# Patient Record
Sex: FEMALE
[De-identification: names, ages, dates, MRNs, and addresses within clinical notes are randomized per-mention and may not be internally consistent; named-entity substitution may affect disease eponyms.]

---

## 2017-04-05 ENCOUNTER — HOSPITAL ENCOUNTER (EMERGENCY)
Dept: HOSPITAL 14 - H.ER | Age: 47
LOS: 1 days | Discharge: HOME | End: 2017-04-06
Payer: COMMERCIAL

## 2017-04-05 VITALS
RESPIRATION RATE: 18 BRPM | HEART RATE: 101 BPM | OXYGEN SATURATION: 100 % | SYSTOLIC BLOOD PRESSURE: 135 MMHG | DIASTOLIC BLOOD PRESSURE: 78 MMHG | TEMPERATURE: 98.1 F

## 2017-04-05 DIAGNOSIS — R10.31: Primary | ICD-10-CM

## 2017-04-05 LAB
ALBUMIN/GLOB SERPL: 1.1 {RATIO} (ref 1–2.1)
ALP SERPL-CCNC: 78 U/L (ref 38–126)
ALT SERPL-CCNC: 18 U/L (ref 9–52)
AST SERPL-CCNC: 23 U/L (ref 14–36)
BACTERIA #/AREA URNS HPF: (no result) /[HPF]
BASOPHILS # BLD AUTO: 0 K/UL (ref 0–0.2)
BASOPHILS NFR BLD: 0.3 % (ref 0–2)
BILIRUB SERPL-MCNC: 0.4 MG/DL (ref 0.2–1.3)
BILIRUB UR-MCNC: NEGATIVE MG/DL
BUN SERPL-MCNC: 14 MG/DL (ref 7–17)
CALCIUM SERPL-MCNC: 9 MG/DL (ref 8.4–10.2)
CHLORIDE SERPL-SCNC: 107 MMOL/L (ref 98–107)
CO2 SERPL-SCNC: 26 MMOL/L (ref 22–30)
COLOR UR: YELLOW
EOSINOPHIL # BLD AUTO: 0.1 K/UL (ref 0–0.7)
EOSINOPHIL NFR BLD: 1.4 % (ref 0–4)
ERYTHROCYTE [DISTWIDTH] IN BLOOD BY AUTOMATED COUNT: 13.3 % (ref 11.5–14.5)
GLOBULIN SER-MCNC: 3.8 GM/DL (ref 2.2–3.9)
GLUCOSE SERPL-MCNC: 84 MG/DL (ref 65–105)
GLUCOSE UR STRIP-MCNC: NEGATIVE MG/DL
HCT VFR BLD CALC: 38.2 % (ref 34–47)
KETONES UR STRIP-MCNC: NEGATIVE MG/DL
LEUKOCYTE ESTERASE UR-ACNC: NEGATIVE LEU/UL
LYMPHOCYTES # BLD AUTO: 2.7 K/UL (ref 1–4.3)
LYMPHOCYTES NFR BLD AUTO: 26.7 % (ref 20–40)
MCH RBC QN AUTO: 30.2 PG (ref 27–31)
MCHC RBC AUTO-ENTMCNC: 32.8 G/DL (ref 33–37)
MCV RBC AUTO: 92 FL (ref 81–99)
MONOCYTES # BLD: 0.9 K/UL (ref 0–0.8)
MONOCYTES NFR BLD: 9.3 % (ref 0–10)
NEUTROPHILS # BLD: 6.3 K/UL (ref 1.8–7)
NEUTROPHILS NFR BLD AUTO: 62.3 % (ref 50–75)
NRBC BLD AUTO-RTO: 0 % (ref 0–0)
PH UR STRIP: 5 [PH] (ref 5–8)
PLATELET # BLD: 301 K/UL (ref 130–400)
PMV BLD AUTO: 9.4 FL (ref 7.2–11.7)
POTASSIUM SERPL-SCNC: 4.3 MMOL/L (ref 3.6–5)
PROT SERPL-MCNC: 8.1 G/DL (ref 6.3–8.2)
PROT UR STRIP-MCNC: NEGATIVE MG/DL
RBC # UR STRIP: (no result) /UL
RBC #/AREA URNS HPF: 1 /HPF (ref 0–3)
SODIUM SERPL-SCNC: 148 MMOL/L (ref 132–148)
SP GR UR STRIP: 1.03 (ref 1–1.03)
UROBILINOGEN UR-MCNC: 0.2 MG/DL (ref 0.2–1)
WBC # BLD AUTO: 10.2 K/UL (ref 4.8–10.8)
WBC #/AREA URNS HPF: 1 /HPF (ref 0–5)

## 2017-04-05 NOTE — ED PDOC
HPI: Abdomen


Time Seen by Provider: 17 19:17


Chief Complaint (Nursing): Abdominal Pain


Chief Complaint (Provider): right sided abd pain


History Per: Patient


History/Exam Limitations: no limitations


Onset/Duration Of Symptoms: Days (3)


Outside of [place] travel?: No


Current Symptoms Are (Timing): Still Present


Location Of Pain/Discomfort: RLQ, Other (radiaition to back )


Quality Of Discomfort: Sharp, Pressure


Associated Symptoms: Loss Of Appetite, Back Pain.  denies: Fever, Chills, Nausea

, Vomiting, Diarrhea, Chest Pain, Constipation, Urinary Symptoms


Exacerbating Factors: Movement


Alleviating Factors: None


Last Bowel Movement: Today


Abnormal Vaginal Bleeding: No





Past Medical History


Reviewed: Historical Data, Nursing Documentation, Vital Signs


Vital Signs: 


 Last Vital Signs











Temp  98.1 F   17 17:42


 


Pulse  101 H  17 17:42


 


Resp  18   17 17:42


 


BP  135/78   17 17:42


 


Pulse Ox  100   17 19:19














- Medical History


PMH: Anemia, Rheumatoid Arthritis


   Denies: Chronic Kidney Disease





- Surgical History


Surgical History: Cholecystectomy,  ( x 1)





- Family History


Family History: States: Unknown Family Hx





- Immunization History


Hx Tetanus Toxoid Vaccination: No (unsure)





- Home Medications


Home Medications: 


 Ambulatory Orders











 Medication  Instructions  Recorded


 


Ferrous Sulfate 325 mg PO TID #60 tab 08/15/15


 


MedroxyPROGESTERone [Provera] 10 mg PO TID #28 tab 08/15/15


 


Cephalexin [Keflex] 500 mg PO QID 10 Days 08/17/15


 


Oxycodone HCl/Acetaminophen 1 tab PO QID PRN #15 tab 08/17/15





[Percocet 5-325 mg Tablet]  


 


Fluconazole [Diflucan] 150 mg PO ONCE #1 tab 08/25/15


 


Ibuprofen [Motrin Tab] 800 mg PO Q8 PRN #30 tab 08/25/15


 


Ibuprofen [Motrin] 600 mg PO TID 7 Days 16


 


valACYclovir [Valtrex] 1 gm PO TID 7 Days 16


 


Doxycycline Monohydrate 100 mg PO BID #28 tablet 16


 


Mupirocin 2% Ointment [Bactroban 0.5 gm TP BID #1 tube 16





Ointment]  


 


Naproxen [Naprosyn Tab] 375 mg PO Q8 PRN #21 tab 16














- Allergies


Allergies/Adverse Reactions: 


 Allergies











Allergy/AdvReac Type Severity Reaction Status Date / Time


 


No Known Allergies Allergy   Verified 17 17:41














Review of Systems


ROS Statement: Except As Marked, All Systems Reviewed And Found Negative


Constitutional: Negative for: Fever, Chills


Gastrointestinal: Positive for: Abdominal Pain.  Negative for: Nausea, Vomiting





Physical Exam





- Reviewed


Nursing Documentation Reviewed: Yes


Vital Signs Reviewed: Yes





- Physical Exam


Appears: Positive for: Non-toxic, No Acute Distress, Uncomfortable


Head Exam: Positive for: ATRAUMATIC, NORMAL INSPECTION, NORMOCEPHALIC


Skin: Positive for: Normal Color, Warm, DRY


Cardiovascular/Chest: Positive for: Regular Rate, Rhythm


Respiratory: Positive for: CNT, Normal Breath Sounds


Gastrointestinal/Abdominal: Positive for: Bowel Sounds, Soft, Tenderness (RLQ 

pain), Guarding


Back: Negative for: L CVA Tenderness, R CVA Tenderness


Extremity: Positive for: Normal ROM


Neurologic/Psych: Positive for: Alert, Oriented





- ECG


O2 Sat by Pulse Oximetry: 100





- Progress


ED Course And Treament: 


cbc/cmp/fluids torodol and CT scna r/o appy





Disposition





- Clinical Impression


Clinical Impression: 


 Abdominal pain





- Patient ED Disposition


Is Patient to be Admitted: Transfer of Care





- Disposition


Disposition: Transfer of Care


Disposition Time: 20:00


Condition: STABLE


Patient Signed Over To: Amber Liz


Handoff Comments: pending CT scan and labs.

## 2017-04-06 NOTE — ED PDOC
- Laboratory Results


Result Diagrams: 


 04/05/17 21:40





 04/05/17 21:40





- ECG


O2 Sat by Pulse Oximetry: 100





- Progress


ED Course And Treament: 


Case endorsed to writer from [facility] pending labs, CT





PROCEDURE:  CT Abdomen and Pelvis with contrast





HISTORY:


RLQ pain





COMPARISON:


None.





TECHNIQUE:


Contrast dose: 90 cc of Omnipaque





Radiation dose:





Total exam DLP = 598 mGy-cm.





This CT exam was performed using one or more of the following dose reduction 

techniques: Automated exposure control, adjustment of the mA and/or kV 

according to patient size, and/or use of iterative reconstruction technique.





FINDINGS:





LOWER THORAX:


Unremarkable. 





LIVER:


Unremarkable. No gross lesion or ductal dilatation. 





GALLBLADDER AND BILE DUCTS:


Gallbladder removed 





PANCREAS:


Unremarkable. No gross lesion or ductal dilatation.





SPLEEN:


Unremarkable. 





ADRENALS:


Unremarkable. No mass. 





KIDNEYS AND URETERS:


Unremarkable. No hydronephrosis. No solid mass. 





VASCULATURE:


Unremarkable. No aortic aneurysm. 





BOWEL:


Unremarkable. No obstruction. No gross mural thickening. 





APPENDIX:


Normal appendix. 





PERITONEUM:


Unremarkable. No free fluid. No free air. 





LYMPH NODES:


Unremarkable. No enlarged lymph nodes. 





BLADDER:


Unremarkable. 





REPRODUCTIVE:


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  

There is fluid in the endometrial canal and submucosal enhancement. There is a 

small amount of fluid in the cul-de-sac. The findings are probably related to 

the menstrual cycle or ovarian cyst rupture. 





BONES:


No acute fracture. 





OTHER FINDINGS:


None.





IMPRESSION:


No acute intra-abdominal findings.





Small amount of fluid in the cul-de-sac.  Fluid in the endometrial canal





Patient educated on findings, discharged with rx Naproxen.


Advised follow up PMD, Gyn. 


Return to ED for worsening/concerning symptoms.





Disposition





- Clinical Impression


Clinical Impression: 


 Abdominal pain, Ovarian cyst rupture





- POA


Present On Arrival: None





- Disposition


Referrals: 


[facility] [Outside]


[facility] [Outside]


Disposition: Routine/Home


Disposition Time: 00:16


Condition: STABLE


Prescriptions: 


Naproxen [Naprosyn] 500 mg PO Q12 PRN #20 tablet


 PRN Reason: Pain, Moderate (4-7)


Instructions:  Abdominal Pain (ED), Ovarian Cyst (ED)

## 2017-05-26 ENCOUNTER — HOSPITAL ENCOUNTER (EMERGENCY)
Dept: HOSPITAL 14 - H.ER | Age: 47
Discharge: LEFT BEFORE DISCHARGE | End: 2017-05-26
Payer: COMMERCIAL

## 2017-05-26 VITALS
RESPIRATION RATE: 20 BRPM | SYSTOLIC BLOOD PRESSURE: 130 MMHG | OXYGEN SATURATION: 100 % | TEMPERATURE: 98.1 F | HEART RATE: 87 BPM | DIASTOLIC BLOOD PRESSURE: 82 MMHG

## 2017-05-26 VITALS — BODY MASS INDEX: 26.5 KG/M2

## 2017-05-26 DIAGNOSIS — R11.0: ICD-10-CM

## 2017-05-26 DIAGNOSIS — R10.31: Primary | ICD-10-CM

## 2017-05-26 DIAGNOSIS — M06.9: ICD-10-CM

## 2017-05-26 DIAGNOSIS — D64.9: ICD-10-CM

## 2017-05-26 LAB
ALBUMIN/GLOB SERPL: 1.3 {RATIO} (ref 1–2.1)
ALP SERPL-CCNC: 71 U/L (ref 38–126)
ALT SERPL-CCNC: 24 U/L (ref 9–52)
AST SERPL-CCNC: 23 U/L (ref 14–36)
BACTERIA #/AREA URNS HPF: (no result) /[HPF]
BASOPHILS # BLD AUTO: 0 K/UL (ref 0–0.2)
BASOPHILS NFR BLD: 0.2 % (ref 0–2)
BILIRUB SERPL-MCNC: 0.3 MG/DL (ref 0.2–1.3)
BILIRUB UR-MCNC: NEGATIVE MG/DL
BUN SERPL-MCNC: 8 MG/DL (ref 7–17)
CALCIUM SERPL-MCNC: 8.6 MG/DL (ref 8.4–10.2)
CHLORIDE SERPL-SCNC: 107 MMOL/L (ref 98–107)
CO2 SERPL-SCNC: 25 MMOL/L (ref 22–30)
COLOR UR: YELLOW
EOSINOPHIL # BLD AUTO: 0.1 K/UL (ref 0–0.7)
EOSINOPHIL NFR BLD: 0.3 % (ref 0–4)
ERYTHROCYTE [DISTWIDTH] IN BLOOD BY AUTOMATED COUNT: 13 % (ref 11.5–14.5)
GLOBULIN SER-MCNC: 3.3 GM/DL (ref 2.2–3.9)
GLUCOSE SERPL-MCNC: 88 MG/DL (ref 65–105)
GLUCOSE UR STRIP-MCNC: (no result) MG/DL
HCT VFR BLD CALC: 38.5 % (ref 34–47)
KETONES UR STRIP-MCNC: NEGATIVE MG/DL
LEUKOCYTE ESTERASE UR-ACNC: (no result) LEU/UL
LG PLATELETS BLD QL SMEAR: PRESENT
LIPASE SERPL-CCNC: 88 U/L (ref 23–300)
LYMPHOCYTES # BLD AUTO: 1.2 K/UL (ref 1–4.3)
LYMPHOCYTES NFR BLD AUTO: 6.5 % (ref 20–40)
MCH RBC QN AUTO: 29.7 PG (ref 27–31)
MCHC RBC AUTO-ENTMCNC: 32.7 G/DL (ref 33–37)
MCV RBC AUTO: 90.7 FL (ref 81–99)
MONOCYTES # BLD: 0.9 K/UL (ref 0–0.8)
MONOCYTES NFR BLD: 4.7 % (ref 0–10)
NEUTROPHILS # BLD: 16.4 K/UL (ref 1.8–7)
NEUTROPHILS NFR BLD AUTO: 86 % (ref 42–75)
NEUTROPHILS NFR BLD AUTO: 88.3 % (ref 50–75)
NRBC BLD AUTO-RTO: 0 % (ref 0–0)
PH UR STRIP: 6 [PH] (ref 5–8)
PLATELET # BLD: 297 K/UL (ref 130–400)
PMV BLD AUTO: 9.3 FL (ref 7.2–11.7)
POTASSIUM SERPL-SCNC: 3.9 MMOL/L (ref 3.6–5)
PROT SERPL-MCNC: 7.5 G/DL (ref 6.3–8.2)
PROT UR STRIP-MCNC: NEGATIVE MG/DL
RBC # UR STRIP: (no result) /UL
RBC #/AREA URNS HPF: 1 /HPF (ref 0–3)
SODIUM SERPL-SCNC: 141 MMOL/L (ref 132–148)
SP GR UR STRIP: 1.02 (ref 1–1.03)
TOTAL CELLS COUNTED BLD: 100
UROBILINOGEN UR-MCNC: (no result) MG/DL (ref 0.2–1)
WBC # BLD AUTO: 18.6 K/UL (ref 4.8–10.8)
WBC #/AREA URNS HPF: 1 /HPF (ref 0–5)

## 2017-05-26 NOTE — CT
PROCEDURE:  CT Abdomen and Pelvis with contrast



HISTORY:

RLQ pain, leukocytosis



COMPARISON:

Comparison is made to the previous study dated 04/05/2017



TECHNIQUE:

Contrast dose: 95 cc of Omnipaque 300. 



Axial and reformatted coronal and sagittal CT images of the abdomen 

and pelvis were obtained after IV contrast administration. 



Radiation dose:



Total exam DLP = 663.06 mGy-cm.



This CT exam was performed using one or more of the following dose 

reduction techniques: Automated exposure control, adjustment of the 

mA and/or kV according to patient size, and/or use of iterative 

reconstruction technique.



FINDINGS:



LOWER THORAX:

Unremarkable. 



LIVER:

Unremarkable. No gross lesion or ductal dilatation. 



GALLBLADDER AND BILE DUCTS:

Status post cholecystectomy. 



PANCREAS:

Unremarkable. No gross lesion or ductal dilatation.



SPLEEN:

Unremarkable. 



ADRENALS:

Unremarkable. No mass. 



KIDNEYS AND URETERS:

Mildly dilated collecting system of both kidneys is again noted. No 

evidence of obstructing stone. The kidneys enhance symmetrically. 



VASCULATURE:

Unremarkable. No aortic aneurysm. 



BOWEL:

Mildly dilated small and large bowel lobes demonstrate mild diffuse 

wall thickening. Findings suspicious for enteritis/colitis. No 

evidence of bowel obstruction.  Scattered colonic diverticulosis 

without evidence of diverticulitis.



APPENDIX:

No evidence of appendicitis. 



PERITONEUM:

Unremarkable. No free fluid. No free air. 



LYMPH NODES:

Unremarkable. No enlarged lymph nodes. 



BLADDER:

Unremarkable. 



REPRODUCTIVE:

The uterus is again mildly enlarged demonstrate part cornuate 

configuration.  There is thick enhancing endometrium and small amount 

of fluid seen in the endometrial cavity. The possibility of 

infectious process such as endometritis is not totally excluded 

although less likely as these findings were also seen in the previous 

exam. 



BONES:

No acute fracture. 



OTHER FINDINGS:

None.



IMPRESSION:

No evidence of appendicitis.



Diffuse small and large bowel wall thickening suspicious for 

enteritis/colitis. No evidence of bowel obstruction or pneumatosis.



Re- demonstration of mildly enlarged heterogeneous uterus 

demonstrates part cornuate configuration and diffuse thick enhancing 

endometrium and small amount of fluid in the endometrial cavity.  

Findings have not significantly changed since the previous exam.  

Correlate clinically for possible enteritis.



Small cyst at the left adnexa measures 2 centimeter.



Otherwise no significant interval change since the previous study.

## 2017-05-26 NOTE — US
PROCEDURE:  Pelvic ultrasound dated 05/26/2017



HISTORY:

RLQ pain



COMPARISON:

Comparison made with prior pelvic ultrasound 01/20/2017



TECHNIQUE:

Transabdominal/transvaginal sonographic evaluation of the pelvis 

performed.



FINDINGS:

Uterus is retroverted measuring approximately 7.9 x 7.6 x 4.0 cm.  

Endometrial stripe measures 4.2 mm. There is free fluid seen within 

cul de sac that exhibits low level internal echoes suggesting debris. 



Right ovary measures 3.1 x 1.9 x 1.0 cm exhibits arterial flow. 



Left ovary measures 3.1 x 1.4 0.86 cm exhibits arterial flow.  There 

is a small cyst measuring 2.1 x 1.6 x 1.77 



IMPRESSION:

Small left ovarian cyst.  Small amount of free fluid seen within cul 

de sac which exhibits some low-level internal echoes suggesting 

debris within this free fluid.  Clinic correlation recommended.



Follow-up ultrasound could be performed in 6 weeks to assess for 

resolution

## 2017-05-26 NOTE — ED PDOC
HPI: Abdomen


Time Seen by Provider: 17 09:11


Chief Complaint (Nursing): Abdominal Pain


Chief Complaint (Provider): abdominal pain, pelvic pain


History Per: Patient


History/Exam Limitations: no limitations


Onset/Duration Of Symptoms: Days (2)


Current Symptoms Are (Timing): Still Present


Location Of Pain/Discomfort: RLQ, Suprapubic


Quality Of Discomfort: Sharp


Associated Symptoms: Nausea.  denies: Vomiting, Diarrhea, Loss Of Appetite, 

Urinary Symptoms


Exacerbating Factors: None


Alleviating Factors: None


Last Bowel Movement: Today


Additional Complaint(s): 





46yo female c/o RLQ and suprapubic pelvic pain for last 2 days, associated w 

nausea and vaginal bleeding. Denies fever, urinary symptoms or diarrhea. Had 

surgery w Dr My Martinez for ?uterus problem (shes unsure if endometriosis/

myomectomy/etc).  States menses irregular. Denies possibility of being pregnant

, concern for STDs or vaginal discharge. 





Against Medical Advice





- AMA


Patient Left Against Medical Advice: 


The patient declines admission to the hospital and wishes to leave the 

Emergency Department.  This action is against my medical advice. This decision 

was made with informed refusal. The patient was told that admission to the 

hospital is necessary.  Explanation of the reasons why were discussed.  


The risks of leaving were explained to the patient and include, but are not 

limited to, worsening of known or currently unknown conditions, permanent 

disability and death from undiagnosed or untreated conditions. 


The patient has the capacity to make this informed decision and understands my 

explanation of the current medical problem and risks of leaving. 


The patient voluntarily accepts these risks and signed an AMA form documenting 

our conversation.


The patient was given the opportunity to ask questions and reconsider.  The 

patient was encouraged to return to the Emergency Department at any time for 

further care.








Past Medical History


Reviewed: Historical Data, Nursing Documentation, Vital Signs


Vital Signs: 


 Last Vital Signs











Temp  98.1 F   17 08:59


 


Pulse  87   17 08:59


 


Resp  20   17 08:59


 


BP  130/82   17 08:59


 


Pulse Ox  100   17 13:14














- Medical History


PMH: Anemia, Rheumatoid Arthritis


   Denies: Chronic Kidney Disease





- Surgical History


Surgical History: Cholecystectomy,  ( x 1)





- Family History


Family History: States: Unknown Family Hx





- Living Arrangements


Living Arrangements: With Family





- Social History


Current smoker - smoking cessation education provided: No





- Immunization History


Hx Tetanus Toxoid Vaccination: No (unsure)





- Home Medications


Home Medications: 


 Ambulatory Orders











 Medication  Instructions  Recorded


 


Ferrous Sulfate 325 mg PO TID #60 tab 08/15/15


 


MedroxyPROGESTERone [Provera] 10 mg PO TID #28 tab 08/15/15


 


Cephalexin [Keflex] 500 mg PO QID 10 Days 08/17/15


 


Oxycodone HCl/Acetaminophen 1 tab PO QID PRN #15 tab 08/17/15





[Percocet 5-325 mg Tablet]  


 


Fluconazole [Diflucan] 150 mg PO ONCE #1 tab 08/25/15


 


Ibuprofen [Motrin Tab] 800 mg PO Q8 PRN #30 tab 08/25/15


 


Ibuprofen [Motrin] 600 mg PO TID 7 Days 16


 


valACYclovir [Valtrex] 1 gm PO TID 7 Days 16


 


Doxycycline Monohydrate 100 mg PO BID #28 tablet 16


 


Mupirocin 2% Ointment [Bactroban 0.5 gm TP BID #1 tube 16





Ointment]  


 


Naproxen [Naprosyn Tab] 375 mg PO Q8 PRN #21 tab 16


 


Naproxen [Naprosyn] 500 mg PO Q12 PRN #20 tablet 17


 


Ciprofloxacin [Cipro] 500 mg PO BID #14 tab 17


 


Naproxen [Naprosyn] 500 mg PO BID PRN #14 tablet 17














- Allergies


Allergies/Adverse Reactions: 


 Allergies











Allergy/AdvReac Type Severity Reaction Status Date / Time


 


No Known Allergies Allergy   Verified 17 17:41














Review of Systems


ROS Statement: Except As Marked, All Systems Reviewed And Found Negative


Constitutional: Negative for: Fever, Chills


Cardiovascular: Negative for: Orthopnea


Gastrointestinal: Positive for: Nausea, Abdominal Pain.  Negative for: Vomiting

, Diarrhea


Genitourinary Female: Positive for: Vaginal Bleeding, Pelvic Pain.  Negative for

: Dysuria, Frequency, Vaginal Discharge


Musculoskeletal: Negative for: Leg Pain


Skin: Negative for: Rash, Lesions


Neurological: Negative for: Weakness, Change in Speech, Confusion





Physical Exam





- Reviewed


Nursing Documentation Reviewed: Yes


Vital Signs Reviewed: Yes





- Physical Exam


Appears: Positive for: Well, Non-toxic, No Acute Distress


Head Exam: Positive for: ATRAUMATIC, NORMAL INSPECTION, NORMOCEPHALIC


Skin: Positive for: Normal Color, Warm, DRY


Eye Exam: Positive for: EOMI, Normal appearance, PERRL


ENT: Positive for: Normal ENT Inspection


Neck: Positive for: Normal, Painless ROM


Cardiovascular/Chest: Positive for: Regular Rate, Rhythm


Respiratory: Positive for: CNT, Normal Breath Sounds


Gastrointestinal/Abdominal: Positive for: Bowel Sounds, Soft, Tenderness (RLQ).

  Negative for: Guarding, Rebound


Back: Positive for: Normal Inspection


Extremity: Positive for: Normal ROM


Neurologic/Psych: Positive for: Alert, Oriented.  Negative for: Motor/Sensory 

Deficits





- Laboratory Results


Result Diagrams: 


 17 10:00





 17 10:00





- ECG


O2 Sat by Pulse Oximetry: 100





Medical Decision Making


Medical Decision Making: 





Workup initiated for pelvic pain in likely perimenopausal female


Bloodwork and imaging ordered.


Preg neg.


UDip +blood





Labs reviewed, reveal elev WBC at 18, will obtain CT imaging r/o appendicitis 





CT report reviewed, appendix normal, poss endometritis but looks similar to 

prior study.


US pelvis +flow both ovaries free fluid internal echoes. 





1pm- states has to  son, I explained potential for pelvic infection and 

need for pelvic exam and further testing but states she feels better and needs 

to leave hospital. Pt signed AMA after risks and benefits explained.  Rx cipro 

but understood may need further testing or Abx and to return to ER for 

completion of treatment when able to do so. 














Disposition





- Clinical Impression


Clinical Impression: 


 Abdominal pain, Left against medical advice








- Patient ED Disposition


Is Patient to be Admitted: No


Counseled Patient/Family Regarding: Studies Performed, Diagnosis, Need For 

Followup, Rx Given





- Disposition


[facility]errals: 


[facility] [[facility]


Disposition: Against Medical Advice


Disposition Time: 13:00


Condition: STABLE


Additional Instructions: 


Take medication as directed,  return to ER at anytime for completion of testing 

and treatment.


Prescriptions: 


Ciprofloxacin [Cipro] 500 mg PO BID #14 tab


Naproxen [Naprosyn] 500 mg PO BID PRN #14 tablet


 PRN Reason: Pain, Moderate (4-7)


Instructions:  Acute Abdominal Pain (ED), Pelvic Pain in Women (ED), Against 

Medical Advice (ED)

## 2017-09-06 ENCOUNTER — HOSPITAL ENCOUNTER (OUTPATIENT)
Dept: HOSPITAL 31 - C.ENDO | Age: 47
Discharge: HOME | End: 2017-09-06
Attending: INTERNAL MEDICINE
Payer: COMMERCIAL

## 2017-09-06 VITALS — DIASTOLIC BLOOD PRESSURE: 60 MMHG | SYSTOLIC BLOOD PRESSURE: 102 MMHG | HEART RATE: 88 BPM | RESPIRATION RATE: 9 BRPM

## 2017-09-06 VITALS — OXYGEN SATURATION: 100 % | TEMPERATURE: 98.6 F

## 2017-09-06 VITALS — BODY MASS INDEX: 26.5 KG/M2

## 2017-09-06 DIAGNOSIS — K52.89: Primary | ICD-10-CM

## 2017-09-06 DIAGNOSIS — K57.30: ICD-10-CM

## 2017-09-06 DIAGNOSIS — K64.8: ICD-10-CM

## 2017-09-06 PROCEDURE — 45378 DIAGNOSTIC COLONOSCOPY: CPT

## 2017-09-06 PROCEDURE — 84703 CHORIONIC GONADOTROPIN ASSAY: CPT

## 2017-11-21 ENCOUNTER — HOSPITAL ENCOUNTER (EMERGENCY)
Dept: HOSPITAL 14 - H.ER | Age: 47
Discharge: HOME | End: 2017-11-21
Payer: COMMERCIAL

## 2017-11-21 VITALS
HEART RATE: 84 BPM | OXYGEN SATURATION: 97 % | RESPIRATION RATE: 15 BRPM | SYSTOLIC BLOOD PRESSURE: 119 MMHG | DIASTOLIC BLOOD PRESSURE: 78 MMHG | TEMPERATURE: 98.2 F

## 2017-11-21 VITALS — BODY MASS INDEX: 26.5 KG/M2

## 2017-11-21 DIAGNOSIS — J02.9: ICD-10-CM

## 2017-11-21 DIAGNOSIS — H60.93: Primary | ICD-10-CM

## 2017-11-21 NOTE — ED PDOC
HPI: CCC, URI, Sore Throat


Time Seen by Provider: 17 14:46


Chief Complaint (Nursing): ENT Problem


Chief Complaint (Provider): Sore throat, ear pain


History Per: Patient


History/Exam Limitations: no limitations


Onset/Duration Of Symptoms: Days (x 1 week)


Current Symptoms Are (Timing): Still Present


Location Of Pain: Ear(s), Throat


Sick Contacts (Context): Family Member(s)


Additional Complaint(s): 





An is a 48 y/o female who presents to the ED complaining of a one week 

history of left ear pain that became associated with right ear pain 2 days ago, 

nasal congestion, and sore throat.  She notes swallowing makes the ear pain 

worse, and feels as though the ear pain radiates to her throat.  She denies any 

fever, chills, or cough. Patient reports sick contact in her son who has been 

sick with similar symptoms.  She reports attempting Ibuprofen in the past for 

pain, but denies taking any medication today for symptoms.





PMD: unsure of name  





Past Medical History


Reviewed: Historical Data, Nursing Documentation, Vital Signs


Vital Signs: 


 Last Vital Signs











Temp  98.2 F   17 15:27


 


Pulse  84   17 15:27


 


Resp  15   17 15:27


 


BP  119/78   17 15:27


 


Pulse Ox  97   17 15:27














- Medical History


PMH: No Chronic Diseases, Anemia, Rheumatoid Arthritis


   Denies: Chronic Kidney Disease





- Surgical History


Surgical History: Cholecystectomy,  ( x 1)





- Family History


Family History: States: Unknown Family Hx





- Immunization History


Hx Tetanus Toxoid Vaccination: No (unsure)





- Home Medications


Home Medications: 


 Ambulatory Orders











 Medication  Instructions  Recorded


 


Acetaminophen [Tylenol 325mg tab] 650 mg PO QID 7 Days #25 tab 17


 


Amoxicillin 500 mg PO TID 10 Days #30 tablet 17


 


Ibuprofen 600 mg PO QID 7 Days #25 tablet 17


 


Neomycin/Polymyxin/Hydrocort 1 drop AU QID 7 Days #1 bottle 17





[Cortisporin Otic Soln]  














- Allergies


Allergies/Adverse Reactions: 


 Allergies











Allergy/AdvReac Type Severity Reaction Status Date / Time


 


SEASONAL Allergy  CONGESTION Uncoded 17 14:55














Review of Systems


ROS Statement: Except As Marked, All Systems Reviewed And Found Negative


Constitutional: Negative for: Fever, Chills


ENT: Positive for: Ear Pain (bilateral), Nose Congestion, Throat Pain, Other (

pain with swallowing)


Respiratory: Negative for: Cough





Physical Exam





- Reviewed


Nursing Documentation Reviewed: Yes


Vital Signs Reviewed: Yes





- Physical Exam


Appears: Positive for: Well, Non-toxic, No Acute Distress


Head Exam: Positive for: ATRAUMATIC, NORMAL INSPECTION, NORMOCEPHALIC


Skin: Positive for: Normal Color, Warm, Dry


Eye Exam: Positive for: EOMI, Normal appearance, PERRL


ENT: Positive for: TM Is/Are (Left ear canal erythematous and edematous, (+) 

tenderness to left tragus. Left TM with mild erythema. Right TM erythematous 

and bulging, also (+) tenderness on palpation of right tragus), Pharyngeal 

Erythema, Tonsillar Swelling (bilaterally, erythematous, no exudates noted).  

Negative for: Tonsillar Exudate


Neck: Positive for: Normal, Supple


Cardiovascular/Chest: Positive for: Regular Rate, Rhythm.  Negative for: Murmur


Respiratory: Positive for: Normal Breath Sounds.  Negative for: Accessory 

Muscle Use, Respiratory Distress


Neurologic/Psych: Positive for: Alert, Oriented (x 3)





- ECG


O2 Sat by Pulse Oximetry: 97


Pulse Ox Interpretation: Normal





Medical Decision Making


Medical Decision Making: 


Initial Impression: Otitis media





Time: 15:13


Initial Plan:


--Tylenol 650 mg PO


--Cortisporin Otic solution, 4 drops AD


--Amoxil 500 mg PO





On reevaluation, patient resting comfortably in no acute distress.  She reports 

improvement of symptoms.  Patient stable for discharge.





Patient advised to take prescribed medications, and to f/u with PMD.  Return 

for any worsening or change in symptoms.


--------------------------------------------------------------------------------

-----------------


Scribe Attestation:   


Documented by Dione Mesa, acting as a scribe for Christie Decker PA-C





Provider Scribe Attestation:


All medical record entries made by the Scribe were at my direction and 

personally dictated by me. I have reviewed the chart and agree that the record 

accurately reflects my personal performance of the history, physical exam, 

medical decision making, and the department course for this patient. I have 

also personally directed, reviewed, and agree with the discharge instructions 

and disposition.








Disposition





- Clinical Impression


Clinical Impression: 


 Otitis media, Otitis externa, Pharyngitis








- Patient ED Disposition


Is Patient to be Admitted: No


Counseled Patient/Family Regarding: Diagnosis, Need For Followup, Rx Given





- Disposition


Referrals: 


[facility] [Outside]


Disposition: Routine/Home


Disposition Time: 15:50


Condition: STABLE


Additional Instructions: 


Take prescribed medications, and f/u with PMD.  Return for any worsening or 

change in symptoms.


Prescriptions: 


Acetaminophen [Tylenol 325mg tab] 650 mg PO QID 7 Days #25 tab


Amoxicillin 500 mg PO TID 10 Days #30 tablet


Ibuprofen 600 mg PO QID 7 Days #25 tablet


Neomycin/Polymyxin/Hydrocort [Cortisporin Otic Soln] 1 drop AU QID 7 Days #1 

bottle


Instructions:  Pharyngitis (ED), Otitis Externa (ED), Otitis Media (ED), Upper 

Respiratory Infection (ED)


Forms:  [facility] (English)


Print Language: ENGLISH

## 2017-11-23 ENCOUNTER — HOSPITAL ENCOUNTER (EMERGENCY)
Dept: HOSPITAL 14 - H.ER | Age: 47
Discharge: HOME | End: 2017-11-23
Payer: COMMERCIAL

## 2017-11-23 VITALS — RESPIRATION RATE: 17 BRPM

## 2017-11-23 VITALS
TEMPERATURE: 98 F | HEART RATE: 104 BPM | DIASTOLIC BLOOD PRESSURE: 78 MMHG | OXYGEN SATURATION: 98 % | SYSTOLIC BLOOD PRESSURE: 120 MMHG

## 2017-11-23 VITALS — BODY MASS INDEX: 26.5 KG/M2

## 2017-11-23 DIAGNOSIS — M06.9: ICD-10-CM

## 2017-11-23 DIAGNOSIS — J11.1: Primary | ICD-10-CM

## 2017-11-23 LAB
ALBUMIN/GLOB SERPL: 1.2 {RATIO} (ref 1–2.1)
ALP SERPL-CCNC: 77 U/L (ref 38–126)
ALT SERPL-CCNC: 30 U/L (ref 9–52)
AST SERPL-CCNC: 23 U/L (ref 14–36)
BACTERIA #/AREA URNS HPF: (no result) /[HPF]
BASOPHILS # BLD AUTO: 0 K/UL (ref 0–0.2)
BASOPHILS NFR BLD: 0.2 % (ref 0–2)
BILIRUB SERPL-MCNC: 0.4 MG/DL (ref 0.2–1.3)
BILIRUB UR-MCNC: NEGATIVE MG/DL
BUN SERPL-MCNC: 10 MG/DL (ref 7–17)
CALCIUM SERPL-MCNC: 8.6 MG/DL (ref 8.4–10.2)
CHLORIDE SERPL-SCNC: 106 MMOL/L (ref 98–107)
CO2 SERPL-SCNC: 25 MMOL/L (ref 22–30)
COLOR UR: YELLOW
EOSINOPHIL # BLD AUTO: 0 K/UL (ref 0–0.7)
EOSINOPHIL NFR BLD: 0.3 % (ref 0–4)
ERYTHROCYTE [DISTWIDTH] IN BLOOD BY AUTOMATED COUNT: 13.7 % (ref 11.5–14.5)
GLOBULIN SER-MCNC: 3.5 GM/DL (ref 2.2–3.9)
GLUCOSE SERPL-MCNC: 109 MG/DL (ref 65–105)
GLUCOSE UR STRIP-MCNC: (no result) MG/DL
HCT VFR BLD CALC: 38.4 % (ref 34–47)
KETONES UR STRIP-MCNC: 80 MG/DL
LEUKOCYTE ESTERASE UR-ACNC: (no result) LEU/UL
LYMPHOCYTES # BLD AUTO: 0.2 K/UL (ref 1–4.3)
LYMPHOCYTES NFR BLD AUTO: 2.1 % (ref 20–40)
MCH RBC QN AUTO: 29.4 PG (ref 27–31)
MCHC RBC AUTO-ENTMCNC: 33 G/DL (ref 33–37)
MCV RBC AUTO: 88.9 FL (ref 81–99)
MONOCYTES # BLD: 0.7 K/UL (ref 0–0.8)
MONOCYTES NFR BLD: 7.4 % (ref 0–10)
NEUTROPHILS # BLD: 8.1 K/UL (ref 1.8–7)
NEUTROPHILS NFR BLD AUTO: 89 % (ref 42–75)
NEUTROPHILS NFR BLD AUTO: 90 % (ref 50–75)
NRBC BLD AUTO-RTO: 0 % (ref 0–0)
PH UR STRIP: 5 [PH] (ref 5–8)
PLATELET # BLD: 232 K/UL (ref 130–400)
PMV BLD AUTO: 9.6 FL (ref 7.2–11.7)
POTASSIUM SERPL-SCNC: 3.5 MMOL/L (ref 3.6–5)
PROT SERPL-MCNC: 7.9 G/DL (ref 6.3–8.2)
PROT UR STRIP-MCNC: 30 MG/DL
RBC # UR STRIP: (no result) /UL
RBC #/AREA URNS HPF: 4 /HPF (ref 0–3)
SODIUM SERPL-SCNC: 141 MMOL/L (ref 132–148)
SP GR UR STRIP: 1.03 (ref 1–1.03)
STOMATOCYTES BLD QL SMEAR: (no result)
TOTAL CELLS COUNTED BLD: 100
UROBILINOGEN UR-MCNC: (no result) MG/DL (ref 0.2–1)
WBC # BLD AUTO: 9 K/UL (ref 4.8–10.8)
WBC #/AREA URNS HPF: 2 /HPF (ref 0–5)

## 2017-11-23 PROCEDURE — 81025 URINE PREGNANCY TEST: CPT

## 2017-11-23 PROCEDURE — 80053 COMPREHEN METABOLIC PANEL: CPT

## 2017-11-23 PROCEDURE — 81003 URINALYSIS AUTO W/O SCOPE: CPT

## 2017-11-23 PROCEDURE — 99284 EMERGENCY DEPT VISIT MOD MDM: CPT

## 2017-11-23 PROCEDURE — 87804 INFLUENZA ASSAY W/OPTIC: CPT

## 2017-11-23 PROCEDURE — 85025 COMPLETE CBC W/AUTO DIFF WBC: CPT

## 2017-11-23 PROCEDURE — 96375 TX/PRO/DX INJ NEW DRUG ADDON: CPT

## 2017-11-23 PROCEDURE — 87040 BLOOD CULTURE FOR BACTERIA: CPT

## 2017-11-23 PROCEDURE — 96372 THER/PROPH/DIAG INJ SC/IM: CPT

## 2017-11-23 PROCEDURE — 96374 THER/PROPH/DIAG INJ IV PUSH: CPT

## 2017-11-23 PROCEDURE — 71020: CPT

## 2017-11-23 NOTE — ED PDOC
HPI: Abdomen


Time Seen by Provider: 17 16:32


Chief Complaint (Nursing): GI Problem


Chief Complaint (Provider): vomtinig,cough body aches


Location Of Pain/Discomfort: Diffuse


Quality Of Discomfort: Cramping, Burning


Additional Complaint(s): 





46yo F in ER for eval of diffuse body aches, cough productive ear pain vomiting 

[id number]. was seen in ER 2d ago dx with ear infection-states she attempted to 

take abx but vomited and has not been able to tolerate PO .    





Past Medical History


Reviewed: Historical Data, Nursing Documentation, Vital Signs


Vital Signs: 


 Last Vital Signs











Temp  100 F H  17 18:03


 


Pulse  110 H  17 18:03


 


Resp  17   17 18:03


 


BP  122/80   17 18:03


 


Pulse Ox  98   17 18:03














- Medical History


PMH: Anemia, Rheumatoid Arthritis


   Denies: Chronic Kidney Disease





- Surgical History


Surgical History: Cholecystectomy,  ( x 1)





- Family History


Family History: States: Unknown Family Hx





- Immunization History


Hx Tetanus Toxoid Vaccination: No (unsure)





- Home Medications


Home Medications: 


 Ambulatory Orders











 Medication  Instructions  Recorded


 


Acetaminophen [Tylenol 325mg tab] 650 mg PO QID 7 Days #25 tab 17


 


Amoxicillin 500 mg PO TID 10 Days #30 tablet 17


 


Ibuprofen 600 mg PO QID 7 Days #25 tablet 17


 


Neomycin/Polymyxin/Hydrocort 1 drop AU QID 7 Days #1 bottle 17





[Cortisporin Otic Soln]  


 


Clindamycin [Cleocin] 300 mg PO TID #30 cap 17


 


Oseltamivir Phosphate [Tamiflu] 75 mg PO BID #10 capsule 17














- Allergies


Allergies/Adverse Reactions: 


 Allergies











Allergy/AdvReac Type Severity Reaction Status Date / Time


 


SEASONAL Allergy  CONGESTION Uncoded 17 16:16














Review of Systems


ROS Statement: Except As Marked, All Systems Reviewed And Found Negative


Constitutional: Positive for: Fever, Chills


ENT: Positive for: Ear Pain


Respiratory: Positive for: Cough.  Negative for: Shortness of Breath


Skin: Negative for: Rash





Physical Exam





- Reviewed


Nursing Documentation Reviewed: Yes


Vital Signs Reviewed: Yes





- Physical Exam


Appears: Positive for: Well, Non-toxic, No Acute Distress


Skin: Positive for: Normal Color, Warm, DRY


Eye Exam: Positive for: EOMI, Normal appearance, PERRL


ENT: Positive for: TM Is/Are (left ear: canal swollen, very painful speculum 

exam.  ).  Negative for: Nasal Congestion, Pharyngeal Erythema, Tonsillar 

Exudate, Tonsillar Swelling


Neck: Positive for: Normal, Painless ROM


Cardiovascular/Chest: Positive for: Regular Rate, Rhythm


Respiratory: Positive for: CNT, Normal Breath Sounds


Gastrointestinal/Abdominal: Positive for: Normal Exam, Bowel Sounds, Soft


Neurologic/Psych: Positive for: Alert, Oriented





- Laboratory Results


Result Diagrams: 


 17 16:50





 17 16:50





- ECG


O2 Sat by Pulse Oximetry: 99





- Radiology


X-Ray: Interpreted by Me


X-Ray Interpretation: No Acute Disease





- Progress


ED Course And Treament: 








 Orders











 Category Date Time Status


 


 COMP METABOLIC PANEL Stat Chem  17 16:50 Completed


 


 CHEST TWO VIEWS (PA/LAT) [RAD] Stat Exams  17 16:52 Taken


 


 CBC (WITH DIFFERENTIAL) Stat HEMA  17 16:50 Results


 


 Famotidine [Pepcid] Med  17 16:57 Discontinued





 20 mg .ROUTE .STK-MED ONE   


 


 Famotidine [Pepcid] Med  17 16:46 Discontinued





 20 mg IVP STAT STA   


 


 Ketorolac [Toradol] Med  17 16:57 Discontinued





 30 mg .ROUTE .STK-MED ONE   


 


 Ketorolac [Toradol] Med  17 16:46 Discontinued





 30 mg IM STAT STA   


 


 Ondansetron [Zofran Inj] Med  17 16:57 Discontinued





 4 mg .ROUTE .STK-MED ONE   


 


 Ondansetron [Zofran Inj] Med  17 16:46 Discontinued





 4 mg IVP STAT STA   


 


 Sodium Chloride 0.9% 1,000 ml Med  17 16:46 Active





 IV 1,000 mls/hr   


 


 BLOOD CULTURE Stat Micro  17 16:50 Ordered


 


 INFLUENZA A B Stat Serology  17 16:50 Received


 


 URINALYSIS Stat URINALYSIS  17 16:50 Completed











Condition: Re-examined (pt still with HA-pt givne tylenol. PT still with abd 

pain -will order CT of abd. )





Medical Decision Making


Medical Decision Making: 





VS improved in ED. PT (+) for the Influ A. 


chest xray: NAD


labs: no elevated WBC


Pt will be given first dose of tamiflu. PT state that Amoxicillin she was given 

during last Er visist made her face swollen and request alternate Abx for OM/

OE. pt will be given clindamycin





 











Temp Pulse Resp BP Pulse Ox


 


 100 F H  110 H  17   122/80   98 


 


 17 18:03  17 18:03  17 18:03  17 18:03  17 18:03























  17





  16:50 16:50 16:50


 


WBC   


 


RBC   


 


Hgb   


 


Hct   


 


MCV   


 


MCH   


 


MCHC   


 


RDW   


 


Plt Count   


 


MPV   


 


Neut % (Auto)   


 


Lymph % (Auto)   


 


Mono % (Auto)   


 


Eos % (Auto)   


 


Baso % (Auto)   


 


Neut #   


 


Lymph #   


 


Mono #   


 


Eos #   


 


Baso #   


 


Neutrophils % (Manual)   


 


Lymphocytes % (Manual)   


 


Monocytes % (Manual)   


 


Platelet Estimate   


 


Sodium    141


 


Potassium    3.5 L


 


Chloride    106


 


Carbon Dioxide    25


 


Anion Gap    14


 


BUN    10


 


Creatinine    0.6 L


 


Est GFR ( Amer)    > 60


 


Est GFR (Non-Af Amer)    > 60


 


Random Glucose    109 H


 


Calcium    8.6


 


Total Bilirubin    0.4


 


AST    23


 


ALT    30


 


Alkaline Phosphatase    77


 


Total Protein    7.9


 


Albumin    4.3


 


Globulin    3.5


 


Albumin/Globulin Ratio    1.2


 


Urine Color  Yellow  


 


Urine Clarity  Slighty-cloudy  


 


Urine pH  5.0  


 


Ur Specific Gravity  1.026  


 


Urine Protein  30  


 


Urine Glucose (UA)  Neg  


 


Urine Ketones  80  


 


Urine Blood  Small  


 


Urine Nitrate  Negative  


 


Urine Bilirubin  Negative  


 


Urine Urobilinogen  0.2-1.0  


 


Ur Leukocyte Esterase  Neg  


 


Urine RBC (Auto)  4 H  


 


Urine Microscopic WBC  2  


 


Ur Squamous Epith Cells  3  


 


Urine Bacteria  Rare  


 


Influenza Typ A,B (EIA)   Pos for influenza a H 














  17





  16:50


 


WBC  9.0  D


 


RBC  4.31


 


Hgb  12.7


 


Hct  38.4


 


MCV  88.9


 


MCH  29.4


 


MCHC  33.0


 


RDW  13.7


 


Plt Count  232


 


MPV  9.6


 


Neut % (Auto)  90.0 H


 


Lymph % (Auto)  2.1 L


 


Mono % (Auto)  7.4


 


Eos % (Auto)  0.3


 


Baso % (Auto)  0.2


 


Neut #  8.1 H


 


Lymph #  0.2 L


 


Mono #  0.7


 


Eos #  0.0


 


Baso #  0.0


 


Neutrophils % (Manual)  Pending


 


Lymphocytes % (Manual)  Pending


 


Monocytes % (Manual)  Pending


 


Platelet Estimate  Pending


 


Sodium 


 


Potassium 


 


Chloride 


 


Carbon Dioxide 


 


Anion Gap 


 


BUN 


 


Creatinine 


 


Est GFR ( Amer) 


 


Est GFR (Non-Af Amer) 


 


Random Glucose 


 


Calcium 


 


Total Bilirubin 


 


AST 


 


ALT 


 


Alkaline Phosphatase 


 


Total Protein 


 


Albumin 


 


Globulin 


 


Albumin/Globulin Ratio 


 


Urine Color 


 


Urine Clarity 


 


Urine pH 


 


Ur Specific Gravity 


 


Urine Protein 


 


Urine Glucose (UA) 


 


Urine Ketones 


 


Urine Blood 


 


Urine Nitrate 


 


Urine Bilirubin 


 


Urine Urobilinogen 


 


Ur Leukocyte Esterase 


 


Urine RBC (Auto) 


 


Urine Microscopic WBC 


 


Ur Squamous Epith Cells 


 


Urine Bacteria 


 


Influenza Typ A,B (EIA) 














Disposition





- Clinical Impression


Clinical Impression: 


 Influenza








- Patient ED Disposition


Is Patient to be Admitted: No


Counseled Patient/Family Regarding: Need For Followup





- Disposition


Disposition: Routine/Home


Disposition Time: 18:14


Condition: STABLE


Prescriptions: 


Clindamycin [Cleocin] 300 mg PO TID #30 cap


Oseltamivir Phosphate [Tamiflu] 75 mg PO BID #10 capsule


Instructions:  Influenza (ED)


Forms:  [facility] ED School/Work Excuse

## 2017-11-24 NOTE — RAD
HISTORY:

cough  



COMPARISON:

Comparison is made to 08/12/2015



TECHNIQUE:

Chest PA and lateral



FINDINGS:



LUNGS:

No active pulmonary disease.



PLEURA:

No significant pleural effusion identified. No pneumothorax apparent.



CARDIOVASCULAR:

Normal.



OSSEOUS STRUCTURES:

No significant abnormalities.



VISUALIZED UPPER ABDOMEN:

Normal.



OTHER FINDINGS:

None.



IMPRESSION:

No active disease.

## 2017-12-05 ENCOUNTER — HOSPITAL ENCOUNTER (EMERGENCY)
Dept: HOSPITAL 14 - H.ER | Age: 47
Discharge: LEFT BEFORE DISCHARGE | End: 2017-12-05
Payer: COMMERCIAL

## 2017-12-05 VITALS
RESPIRATION RATE: 20 BRPM | TEMPERATURE: 97.7 F | DIASTOLIC BLOOD PRESSURE: 83 MMHG | OXYGEN SATURATION: 100 % | HEART RATE: 109 BPM | SYSTOLIC BLOOD PRESSURE: 101 MMHG

## 2017-12-05 VITALS — BODY MASS INDEX: 25.7 KG/M2

## 2017-12-05 DIAGNOSIS — M06.9: ICD-10-CM

## 2017-12-05 DIAGNOSIS — R10.31: Primary | ICD-10-CM

## 2017-12-05 DIAGNOSIS — N83.202: ICD-10-CM

## 2017-12-05 LAB
ALBUMIN/GLOB SERPL: 1.2 {RATIO} (ref 1–2.1)
ALP SERPL-CCNC: 71 U/L (ref 38–126)
ALT SERPL-CCNC: 30 U/L (ref 9–52)
AST SERPL-CCNC: 16 U/L (ref 14–36)
BASOPHILS # BLD AUTO: 0 K/UL (ref 0–0.2)
BASOPHILS NFR BLD: 0.2 % (ref 0–2)
BILIRUB SERPL-MCNC: 0.7 MG/DL (ref 0.2–1.3)
BUN SERPL-MCNC: 10 MG/DL (ref 7–17)
CALCIUM SERPL-MCNC: 8.2 MG/DL (ref 8.4–10.2)
CHLORIDE SERPL-SCNC: 106 MMOL/L (ref 98–107)
CO2 SERPL-SCNC: 25 MMOL/L (ref 22–30)
EOSINOPHIL # BLD AUTO: 0 K/UL (ref 0–0.7)
EOSINOPHIL NFR BLD: 0.3 % (ref 0–4)
ERYTHROCYTE [DISTWIDTH] IN BLOOD BY AUTOMATED COUNT: 13.8 % (ref 11.5–14.5)
GLOBULIN SER-MCNC: 3.4 GM/DL (ref 2.2–3.9)
GLUCOSE SERPL-MCNC: 96 MG/DL (ref 65–105)
HCT VFR BLD CALC: 38.6 % (ref 34–47)
LIPASE SERPL-CCNC: 88 U/L (ref 23–300)
LYMPHOCYTES # BLD AUTO: 1.3 K/UL (ref 1–4.3)
LYMPHOCYTES NFR BLD AUTO: 9.3 % (ref 20–40)
MCH RBC QN AUTO: 29.1 PG (ref 27–31)
MCHC RBC AUTO-ENTMCNC: 32.8 G/DL (ref 33–37)
MCV RBC AUTO: 88.9 FL (ref 81–99)
MONOCYTES # BLD: 1 K/UL (ref 0–0.8)
MONOCYTES NFR BLD: 7.5 % (ref 0–10)
NEUTROPHILS # BLD: 11.2 K/UL (ref 1.8–7)
NEUTROPHILS NFR BLD AUTO: 82.7 % (ref 50–75)
NEUTROPHILS NFR BLD AUTO: 83 % (ref 42–75)
NRBC BLD AUTO-RTO: 0 % (ref 0–0)
PLATELET # BLD: 300 K/UL (ref 130–400)
PMV BLD AUTO: 9 FL (ref 7.2–11.7)
POTASSIUM SERPL-SCNC: 4 MMOL/L (ref 3.6–5)
PROT SERPL-MCNC: 7.7 G/DL (ref 6.3–8.2)
SODIUM SERPL-SCNC: 142 MMOL/L (ref 132–148)
TOTAL CELLS COUNTED BLD: 100
WBC # BLD AUTO: 13.6 K/UL (ref 4.8–10.8)

## 2017-12-05 PROCEDURE — 76830 TRANSVAGINAL US NON-OB: CPT

## 2017-12-05 PROCEDURE — 96374 THER/PROPH/DIAG INJ IV PUSH: CPT

## 2017-12-05 PROCEDURE — 96361 HYDRATE IV INFUSION ADD-ON: CPT

## 2017-12-05 PROCEDURE — 96375 TX/PRO/DX INJ NEW DRUG ADDON: CPT

## 2017-12-05 PROCEDURE — 85025 COMPLETE CBC W/AUTO DIFF WBC: CPT

## 2017-12-05 PROCEDURE — 81025 URINE PREGNANCY TEST: CPT

## 2017-12-05 PROCEDURE — 99284 EMERGENCY DEPT VISIT MOD MDM: CPT

## 2017-12-05 PROCEDURE — 83690 ASSAY OF LIPASE: CPT

## 2017-12-05 PROCEDURE — 80053 COMPREHEN METABOLIC PANEL: CPT

## 2017-12-05 NOTE — US
HISTORY:

pain



COMPARISON:

Transvaginal pelvic ultrasound exam 07/25/2017. 



TECHNIQUE:

Transvaginal pelvic ultrasound was performed with longitudinal and 

transverse images submitted for interpretation.



FINDINGS:



UTERUS:

Measures 7.9 x 7.6 x 4.2 cm. Normal in size and appearance. No 

fibroid or other mass lesion seen.



ENDOMETRIUM:

Measures 8.4 mm in diameter. An ovoid structure measuring 

approximately 1.7 x 0.6 x 0.6 cm is appreciated at the right side of 

the endometrial cavity widening the endometrial stripe and is 

suspicious for possible polyp, endometriosis or other soft tissue 

lesion. 



CERVIX:

Nabothian cysts identified at the anterior cervix with remainder 

unremarkable.



RIGHT OVARY:

Measures 4.7 x 3.9 x 4.2 cm. Multiple small cyst suggestive of 

developing follicle noted are identified within the right ovary 

however an irregular cyst is seen anterior inferiorly which appears 

somewhat triangular-shaped and could reflect collapsing cyst within 

internal nodule best seen in images 76, 77 and 78. Consider follow-up 

6-8 week transvaginal ultrasound or near term MRI for additional 

characterization. Intra-ovarian blood flow was identified with no 

definite pattern of right ovarian torsion appreciated at this time. 



LEFT OVARY:

Measures 5.6 x 4.8 x 3.0 cm. Two simple cyst identified in the right 

ovary with the larger measuring 3.0 x 3.1 x 2.0 cm and the smaller 

measuring 2.3 x 1.1 x 1.7 cm Normal flow. 



FREE FLUID:

No significant free fluid noted.



OTHER FINDINGS:

None. 



IMPRESSION:

1. Soft tissue within the endometrial cavity toward the right 

measures 1.7 cm greatest dimension and suspicious for polyp or other 

endometrial lesion. Gynecological follow-up recommended. 



2. Right ovary appears increased in size possibly due to a complex 

cystic/solid collapsing lesion measuring a minimum of 2.9 x 1.8 cm. 

Follow-up pelvic ultrasound can be performed 6-8 weeks versus MRI 

without and with contrast in the short-term as clinically warranted. 

Gynecological consultation also advised for this finding. 



3. Enlarging left ovary due to two simple appearing cysts.

## 2017-12-05 NOTE — ED PDOC
HPI: Abdomen


Time Seen by Provider: 17 10:27


Chief Complaint (Nursing): Abdominal Pain


Chief Complaint (Provider): Abd pain


History Per: Patient


History/Exam Limitations: no limitations


Onset/Duration Of Symptoms: Days (on going monthly)


Additional Complaint(s): 





Pt. with RLQ pain.  States is a tearing pain.  Happens monthly when her period 

occurs.  Pt. also with right lower back pain.  Nausea, no diarrhea or vomit.  

Weakness all over.  Has had work up in the past for it and no findings.  Goes 

to the clinic for eval.  No dysuria.  Is on her period.  





Past Medical History


Reviewed: Nursing Documentation, Vital Signs


Vital Signs: 


 Last Vital Signs











Temp  97.7 F   17 10:17


 


Pulse  109 H  17 10:17


 


Resp  20   17 10:17


 


BP  101/83   17 10:17


 


Pulse Ox  100   17 12:56














- Medical History


PMH: Anemia, Rheumatoid Arthritis


   Denies: Chronic Kidney Disease





- Surgical History


Surgical History: Cholecystectomy,  ( x 1)





- Family History


Family History: States: Unknown Family Hx





- Living Arrangements


Living Arrangements: With Family





- Social History


Current smoker - smoking cessation education provided: No


Alcohol: None


Drugs: Denies





- Immunization History


Hx Tetanus Toxoid Vaccination: No (unsure)





- Home Medications


Home Medications: 


 Ambulatory Orders











 Medication  Instructions  Recorded


 


Acetaminophen [Tylenol 325mg tab] 650 mg PO QID 7 Days #25 tab 17


 


Amoxicillin 500 mg PO TID 10 Days #30 tablet 17


 


Ibuprofen 600 mg PO QID 7 Days #25 tablet 17


 


Neomycin/Polymyxin/Hydrocort 1 drop AU QID 7 Days #1 bottle 17





[Cortisporin Otic Soln]  


 


Clindamycin [Cleocin] 300 mg PO TID #30 cap 17


 


NaCl/Nahco3/Hyaluron Sod/Aloe 1 - 2 ml NS BID #1 spry.gl.ml 17





[Nasogel Nasal Spray]  


 


Oseltamivir Phosphate [Tamiflu] 75 mg PO BID #10 capsule 17














- Allergies


Allergies/Adverse Reactions: 


 Allergies











Allergy/AdvReac Type Severity Reaction Status Date / Time


 


SEASONAL Allergy  CONGESTION Uncoded 17 16:16














Review of Systems


ROS Statement: Except As Marked, All Systems Reviewed And Found Negative


Constitutional: Positive for: Weakness


Gastrointestinal: Positive for: Nausea, Abdominal Pain


Genitourinary Female: Positive for: Vaginal Bleeding (on period), Pelvic Pain


Neurological: Positive for: Weakness





Physical Exam





- Reviewed


Nursing Documentation Reviewed: Yes


Vital Signs Reviewed: Yes





- Physical Exam


Appears: Positive for: Non-toxic, No Acute Distress


Head Exam: Positive for: ATRAUMATIC, NORMAL INSPECTION, NORMOCEPHALIC


Skin: Positive for: Normal Color, Warm, DRY


Eye Exam: Positive for: EOMI, Normal appearance, PERRL


ENT: Positive for: Normal ENT Inspection


Neck: Positive for: Normal, Painless ROM


Cardiovascular/Chest: Positive for: Regular Rate, Rhythm


Respiratory: Positive for: CNT, Normal Breath Sounds


Gastrointestinal/Abdominal: Positive for: Bowel Sounds, Soft, Tenderness (

diffuse)


Back: Positive for: Normal Inspection.  Negative for: L CVA Tenderness, R CVA 

Tenderness


Extremity: Positive for: Normal ROM


Neurologic/Psych: Positive for: Alert, Oriented





- Laboratory Results


Result Diagrams: 


 17 11:01





 17 11:01


Interpretation Of Abn Labs: 13.6 wbc





- ECG


O2 Sat by Pulse Oximetry: 100


Pulse Ox Interpretation: Normal





- CT Scan/US


  ** US


Other Rad Studies (CT/US): Read By Radiologist


Other Rad Interpretation: ovarian cyst bl





- Progress


ED Course And Treament: 





1256:  Stable.  AAOx3.  Will give morphine for increased pain.  CT needed.





1315:  Pt. refusing to stay for further evaluation and treatment.  Aware of 

possible death or decreased functioning from abd and pelvic pain.  Aware it 

could be appendicitis or internal things causing her pain.  Pt. is aaox3.  Pain 

free.  Has capacity to make decisions.  Family at bedside and agree with pt.  

Ambulated with no issues. 





Disposition





- Clinical Impression


Clinical Impression: 


 Abdominal pain, Ovarian cyst








- Patient ED Disposition


Is Patient to be Admitted: No





- Disposition


Referrals: 


[facility] [Outside]


[facility] [Outside]


Disposition: Against Medical Advice


Disposition Time: :17


Condition: FAIR


Additional Instructions: 


You are going against medical advice.  You are aware of possible death from 

your abdominal and pelvic pain.  It could be appendicitis or other issues that 

can be a major problem.  Return right away for further evaluation.





Go to the primary care doctor and obyn right away for further evaluation.


Instructions:  Acute Abdominal Pain (ED), Ovarian Cyst (ED)

## 2018-03-25 ENCOUNTER — HOSPITAL ENCOUNTER (OUTPATIENT)
Dept: HOSPITAL 14 - H.ER | Age: 48
Setting detail: OBSERVATION
LOS: 1 days | Discharge: HOME | End: 2018-03-26
Attending: FAMILY MEDICINE | Admitting: FAMILY MEDICINE
Payer: COMMERCIAL

## 2018-03-25 VITALS — BODY MASS INDEX: 25.7 KG/M2

## 2018-03-25 DIAGNOSIS — M06.9: ICD-10-CM

## 2018-03-25 DIAGNOSIS — G43.909: ICD-10-CM

## 2018-03-25 DIAGNOSIS — D64.9: ICD-10-CM

## 2018-03-25 DIAGNOSIS — R55: Primary | ICD-10-CM

## 2018-03-25 LAB
ALBUMIN SERPL-MCNC: 4.4 G/DL (ref 3.5–5)
ALBUMIN/GLOB SERPL: 1.1 {RATIO} (ref 1–2.1)
ALT SERPL-CCNC: 27 U/L (ref 9–52)
AST SERPL-CCNC: 22 U/L (ref 14–36)
BASOPHILS # BLD AUTO: 0 K/UL (ref 0–0.2)
BASOPHILS NFR BLD: 0.2 % (ref 0–2)
BUN SERPL-MCNC: 10 MG/DL (ref 7–17)
CALCIUM SERPL-MCNC: 9 MG/DL (ref 8.4–10.2)
EOSINOPHIL # BLD AUTO: 0 K/UL (ref 0–0.7)
EOSINOPHIL NFR BLD: 0.3 % (ref 0–4)
ERYTHROCYTE [DISTWIDTH] IN BLOOD BY AUTOMATED COUNT: 14 % (ref 11.5–14.5)
GFR NON-AFRICAN AMERICAN: > 60
HGB BLD-MCNC: 13.4 G/DL (ref 12–16)
LYMPHOCYTES # BLD AUTO: 1.3 K/UL (ref 1–4.3)
LYMPHOCYTES NFR BLD AUTO: 11.2 % (ref 20–40)
MCH RBC QN AUTO: 29.8 PG (ref 27–31)
MCHC RBC AUTO-ENTMCNC: 33.1 G/DL (ref 33–37)
MCV RBC AUTO: 90 FL (ref 81–99)
MONOCYTES # BLD: 0.9 K/UL (ref 0–0.8)
MONOCYTES NFR BLD: 8.1 % (ref 0–10)
NEUTROPHILS # BLD: 9.3 K/UL (ref 1.8–7)
NEUTROPHILS NFR BLD AUTO: 80.2 % (ref 50–75)
NRBC BLD AUTO-RTO: 0 % (ref 0–0)
PLATELET # BLD: 281 K/UL (ref 130–400)
PMV BLD AUTO: 9.8 FL (ref 7.2–11.7)
RBC # BLD AUTO: 4.51 MIL/UL (ref 3.8–5.2)
WBC # BLD AUTO: 11.5 K/UL (ref 4.8–10.8)

## 2018-03-25 PROCEDURE — 80324 DRUG SCREEN AMPHETAMINES 1/2: CPT

## 2018-03-25 PROCEDURE — 80346 BENZODIAZEPINES1-12: CPT

## 2018-03-25 PROCEDURE — 80053 COMPREHEN METABOLIC PANEL: CPT

## 2018-03-25 PROCEDURE — 80349 CANNABINOIDS NATURAL: CPT

## 2018-03-25 PROCEDURE — 80361 OPIATES 1 OR MORE: CPT

## 2018-03-25 PROCEDURE — 80345 DRUG SCREENING BARBITURATES: CPT

## 2018-03-25 PROCEDURE — 93005 ELECTROCARDIOGRAM TRACING: CPT

## 2018-03-25 PROCEDURE — 80358 DRUG SCREENING METHADONE: CPT

## 2018-03-25 PROCEDURE — 99285 EMERGENCY DEPT VISIT HI MDM: CPT

## 2018-03-25 PROCEDURE — 80353 DRUG SCREENING COCAINE: CPT

## 2018-03-25 PROCEDURE — 71046 X-RAY EXAM CHEST 2 VIEWS: CPT

## 2018-03-25 PROCEDURE — 70450 CT HEAD/BRAIN W/O DYE: CPT

## 2018-03-25 PROCEDURE — 96374 THER/PROPH/DIAG INJ IV PUSH: CPT

## 2018-03-25 PROCEDURE — 81025 URINE PREGNANCY TEST: CPT

## 2018-03-25 PROCEDURE — 85025 COMPLETE CBC W/AUTO DIFF WBC: CPT

## 2018-03-25 PROCEDURE — 83992 ASSAY FOR PHENCYCLIDINE: CPT

## 2018-03-25 NOTE — CT
PROCEDURE:  CT HEAD WITHOUT CONTRAST.



HISTORY:

syncope



COMPARISON:

Comparison made with prior CT scan of the brain 06/19/2011 



TECHNIQUE:

Axial computed tomography images were obtained through the head/brain 

without intravenous contrast.  



Radiation dose:



Total exam DLP = 866.11 mGy-cm.



This CT exam was performed using one or more of the following dose 

reduction techniques: Automated exposure control, adjustment of the 

mA and/or kV according to patient size, and/or use of iterative 

reconstruction technique.



FINDINGS:



HEMORRHAGE:

No acute parenchymal, subarachnoid or extra-axial hemorrhage. 



BRAIN:

No evidence of a large acute infarct.  Choroid plexus cysts and 

represent a prominent pineal gland cyst again noted slightly 

increased in size from prior study. .  Followup nonemergent MRI of 

the brain could be performed for further evaluation. 



VENTRICLES:

No obstructive hydrocephalus. 



CALVARIUM:

No acute calvarial fractures.  There is a tiny radiopaque density 

within the left posterior superior parietal skin surface that may 

represent a small calcification



PARANASAL SINUSES:

Unremarkable as visualized. No significant inflammatory changes.



MASTOID AIR CELLS:

Unremarkable as visualized. No inflammatory changes.



OTHER FINDINGS:

None.



IMPRESSION:

No acute intracranial hemorrhage.  Choroid plexus cysts are again 

seen.  Apparent pineal gland cyst slightly increased in size from 

prior exam.  Followup nonemergent MRI suggested for further 

evaluation.

## 2018-03-25 NOTE — RAD
HISTORY:

syncope  



COMPARISON:

Comparison made with chest radiograph dated 11/13/2017



TECHNIQUE:

Chest PA and lateral



FINDINGS:



LUNGS:

No active pulmonary disease. Minor biapical pleural thickening. 



PLEURA:

No significant pleural effusion identified. No pneumothorax apparent.



CARDIOVASCULAR:

Normal.



OSSEOUS STRUCTURES:

No significant abnormalities.



VISUALIZED UPPER ABDOMEN:

Normal.



OTHER FINDINGS:

None.



IMPRESSION:

No active disease.

## 2018-03-25 NOTE — CP.PCM.HP
History of Present Illness





- History of Present Illness


History of Present Illness: 





"I woke up on my kitchen floor" 





48 y/o female, with PMHx remarkable for RA and migraines presented to [facility] ED 

for evaluation of a suspected syncopal episode. Pt reports she has been having 

a migraine for the last 4 days, associated with some nausea. At 3am she went to 

her kitchen to take Ibuprofen she had an unwitnessed episode of "blacking out 

and passing out". She woke up on the kitchen floor shortly after, and called 

her son. The "black out" occured suddenly, without any prodromal symptoms. The 

length of black out was unknown, and history of head trauma is unknown. She 

awoke suddenly, with mild disorientation of what happened, but knew exactly 

where she was. Her left elbow was sore, but she did not feel as if she hit her 

head. Denies any fever/chills, CP/SOB/palpitations, diaphoresis before the 

event. She denies any tongue biting or incontinence after the episode. No new 

medications. Has been tolerating PO intake all week w/o issue. Denies ETOH/drug 

abuse. She went back to bed and came in this morning for evaluation.





PMD: [facility], last visit 2018


PMHx: RA, migraines


PsurgHx: Cholecystecomy, endometrial ablation, tonsillectomy, 


Meds: Xeljanz (RA)


ALL: NKDA


LMP:  week of march, regular, 3 days of bleeding, 4-5 ppd, no contraception 


SocialHx: denies ETOH/tobacco/Drug abuse


FamilyHx: mother multiple myeloma, HTN, CAD


Next of Kin: Mother Naa


Code Status: full code 





ED course:


Vitals:T 97 F, , /84, RR 16, POX 99% RA


Labs


CBC: wnl


CMP: wnl


Imaging


Noncontrast head CT


Elbow XR


Chest XR


Meds


NS 1 L


Toradol 30mg


Reglan 10mg


Zofran 4mg 





Present on Admission





- Present on Admission


Any Indicators Present on Admission: No


History of DVT/PE: No


History of Uncontrolled Diabetes: No





Review of Systems





- Constitutional


Constitutional: Headache.  absent: As Per HPI, Anorexia, Chills, Daytime 

Sleepiness, Excessive Sweating, Fatigue, Fever, Frequent Falls, Increased 

Appetite, Lethargy, Malaise, Night Sweats, Snoring, Sleep Apnea, Weight Gain, 

Weight Loss, Weakness, Other





- EENT


Eyes: absent: As Per HPI, Blind Spots, Blurred Vision, Change in Vision, 

Decreased Night Vision, Diplopia, Discharge, Dry Eye, Exophthalmos, Floaters, 

Irritation, Itchy Eyes, Loss of Peripheral Vision, Pain, Photophobia, Requires 

Corrective Lenses, Sees Flashes, Spots in Vision, Tunnel Vision, Other Visual 

Disturbances, Loss of Vision, Other


Ears: absent: As Per HPI, Decreased Hearing, Ear Discharge, Ear Pain, Tinnitus, 

Abnormal Hearing, Disequilibrium, Dizziness, Other


Nose/Mouth/Throat: absent: As Per HPI, Epistaxis, Nasal Congestion, Nasal 

Discharge, Nasal Obstruction, Nasal Trauma, Nose Pain, Post Nasal Drip, Sinus 

Pain, Sinus Pressure, Bleeding Gums, Change in Voice, Dental Pain, Dry Mouth, 

Dysphagia, Halitosis, Hoarsness, Lip Swelling, Mouth Lesions, Mouth Pain, 

Odynophagia, Sore Throat, Throat Swelling, Tongue Swelling, Facial Pain, Neck 

Pain, Neck Mass, Other





- Cardiovascular


Cardiovascular: absent: As Per HPI, Acrocyanosis, Chest Pain, Chest Pain at Rest

, Chest Pain with Activity, Claudication, Diaphoresis, Dyspnea, Dyspnea on 

Exertion, Edema, Irregular Heart Rhythm, Pain Radiating to Arm/Neck/Jaw, Leg 

Edema, Leg Ulcers, Lightheadedness, Orthopnea, Palpitations, Paroxysmal 

Nocturnal Dyspnea, Pedal Edema, Radiating Pain, Rapid Heart Rate, Slow Heart 

Rate, Syncope, Other





- Respiratory


Respiratory: absent: As Per HPI, Cough, Dyspnea, Hemoptysis, Dyspnea on Exertion

, Wheezing, Snoring, Stridor, Pain on Inspiration, Chest Congestion, Excessive 

Mucous Production, Change in Mucous Color, Pain with Coughing, Other





- Gastrointestinal


Gastrointestinal: absent: As Per HPI, Abdominal Pain, Belching, Bloating, 

Change in Bowel Habits, Change in Stool Character, Coffee Ground Emesis, 

Constipation, Cramping, Diarrhea, Dyspepsia, Dysphagia, Early Satiety, 

Excessive Flatus, Fecal Incontinence, Heartburn, Hematemesis, Hematochezia, 

Loose Stools, Melena, Nausea, Odynophagia, Temesmus, Vomiting, Other





- Genitourinary


Genitourinary: absent: As Per HPI, Change in Urinary Stream, Difficulty 

Urinating, Dysuria, Flank Pain, Hematuria, Pyuria, Nocturia, Urinary 

Incontinence, Urinary Frequency, Urinary Hesitance, Urinary Urgency, Voiding 

Freq/Small Amts, Freq UTI, Hx Renal/Bladder Calculi, Hx /Renal Surgery, 

Bladder Distension, Other





- Musculoskeletal


Musculoskeletal: absent: As Per HPI, Abnormal Gait, Arthralgias, Atrophy, Back 

Pain, Deformity, Joint Swelling, Limited Range of Motion, Loss of Height, 

Muscle Cramps, Muscle Weakness, Myalgias, Neck Pain, Numbness, Radiating Pain 

into Limb, Stiffness, Tingling, Other





- Integumentary


Integumentary: absent: As Per HPI, Acne, Alopecia, Bleeding Lesions, Change in 

Hair, Change in Nails, Change in Pigmentation, Changing Lesions, Dry Skin, 

Erythema, Furuncle, Hirsutism, Lesions, New Lesions, Non-Healing Lesions, 

Photosensitivity, Pruritus, Rash, Skin Pain, Skin Ulcer, Sores, Striae, Swelling

, Unusual Bruising, Wounds, Jaundice, Other





- Neurological


Neurological: Dizziness, Headaches, Syncope.  absent: As Per HPI, Abnormal Gait

, Abnormal Hearing, Abnormal Movements, Abnormal Speech, Behavioral Changes, 

Burning Sensations, Confusion, Convulsions, Disequilibrium, Numbness, Focal 

Weakness, Frequent Falls, Lack of Coordination, Loss of Vision, Memory Loss, 

Paresthesias, Radicular Pain, Restless Legs, Sensory Deficit, Tingling, Tremor, 

Vertigo, Weakness, Other Visual Disturbances, Other





- Psychiatric


Psychiatric: absent: As Per HPI, Abnormal Sleep Pattern, Anhedonia, Anxiety, 

Auditory Hallucinations, Behavioral Changes, Change in Appetite, Change in 

Libido, Confusion, Depression, Difficulty Concentrating, Hallucinations, 

Homicidal Ideation, Hopelessness, Irritability, Memory Loss, Mood Swings, Panic 

Attacks, Paranoia, Suicidal Ideation, Visual Hallucinations, Tactile 

Hallucinations, Other





Past Patient History





- Infectious Disease


Hx of Infectious Diseases: None





- Tetanus Immunizations


Tetanus Immunization: Unknown





- Past Medical History & Family History


Past Medical History?: Yes





- Past Social History


Smoking Status: Never Smoked





- CARDIAC


Hx Cardiac Disorders: No





- PULMONARY


Hx Respiratory Disorders: No





- NEUROLOGICAL


Hx Migraine: Yes





- HEENT


Hx HEENT Problems: No





- RENAL


Hx Chronic Kidney Disease: No





- ENDOCRINE/METABOLIC


Hx Endocrine Disorders: No





- HEMATOLOGICAL/ONCOLOGICAL


Hx Anemia: Yes





- INTEGUMENTARY


Hx Dermatological Problems: No





- MUSCULOSKELETAL/RHEUMATOLOGICAL


Hx Rheumatoid Arthritis: Yes





- GASTROINTESTINAL


Hx Gastrointestinal Disorders: No





- GENITOURINARY/GYNECOLOGICAL


Hx Genitourinary Disorders: Yes


Other/Comment: WHEN MUCH YOUNGER HAD SURGERY FOR LEAKING BLADDER





- PSYCHIATRIC


Hx Psychophysiologic Disorder: No


Hx Substance Use: No





- SURGICAL HISTORY


Hx Cholecystectomy: Yes





- ANESTHESIA


Hx Anesthesia: Yes


Hx Anesthesia Reactions: No


Hx Malignant Hyperthermia: No





Meds


Allergies/Adverse Reactions: 


 Allergies











Allergy/AdvReac Type Severity Reaction Status Date / Time


 


SEASONAL Allergy  CONGESTION Uncoded 18 08:40














Physical Exam





- Constitutional


Appears: Well, Non-toxic, No Acute Distress





- Head Exam


Head Exam: ATRAUMATIC, NORMAL INSPECTION, NORMOCEPHALIC


Additional comments: 





no tenderness, signs of trauma/injury





- Eye Exam


Eye Exam: EOMI, Normal appearance, PERRL.  absent: Conjunctival injection, 

Nystagmus, Periorbital swelling, Scleral icterus


Pupil Exam: NORMAL ACCOMODATION





- ENT Exam


ENT Exam: Mucous Membranes Moist, Normal Exam, TM's Normal Bilaterally





- Neck Exam


Neck exam: Positive for: Full Rom, Normal Inspection.  Negative for: 

Lymphadenopathy, Meningismus, Tenderness, Thyromegaly





- Respiratory Exam


Respiratory Exam: Clear to Auscultation Bilateral, NORMAL BREATHING PATTERN.  

absent: Decreased Breath Sounds, Rales, Rhonchi, Wheezes, Respiratory Distress





- Cardiovascular Exam


Cardiovascular Exam: REGULAR RHYTHM, RRR, +S1, +S2.  absent: Tachycardia, 

Diastolic murmur, Gallop, JVD, Rubs, Systolic Murmur





- GI/Abdominal Exam


GI & Abdominal Exam: Normal Bowel Sounds, Soft.  absent: Distended, Firm, 

Guarding, Hernia, Mass, Rebound, Rigid, Tenderness





- Extremities Exam


Extremities exam: Positive for: full ROM, normal capillary refill, normal 

inspection, pedal pulses present.  Negative for: calf tenderness, joint swelling

, pedal edema, tenderness





- Back Exam


Back exam: NORMAL INSPECTION.  absent: CVA tenderness (L), CVA tenderness (R)





- Neurological Exam


Neurological exam: Alert, CN II-XII Intact, Normal Gait, Oriented x3, Reflexes 

Normal





- Expanded Neurological Exam


  ** Expanded


Cranial nerves: EOM's Intact: Normal, Facial Sensation: Normal, Gag Reflex: 

Normal, Nystagmus: Normal, Tongue Deviation: Normal


Ataxia: No


Cerebellar Function: Finger to Nose: Normal, Heel to Shin: Normal, Romberg: 

Normal


Neuro motor strength exam: Left Upper Extremity: 5, Right Upper Extremity: 5, 

Left Lower Extremity: 5, Right Lower Extremity: 5


DTR: Bicep Left: 2+, Bicep Right: 2+, Patellar Left: 2+, Patellar Right: 2+


Coma Scale Eye Opening: SPONTANEOUS


Coma Scale Motor Response: OBEYS COMMANDS


Coma Scale Verbal: Oriented


Coma Scale Total: 15





- Psychiatric Exam


Psychiatric exam: Normal Affect, Normal Mood





- Skin


Skin Exam: Dry, Intact, Normal Color, Warm


Additional comments: 





insignificant left elbow abrasion





Results





- Vital Signs


Recent Vital Signs: 





 Last Vital Signs











Temp  97 F L  18 08:40


 


Pulse  57 L  18 11:45


 


Resp  19   18 11:45


 


BP  100/54 L  18 11:45


 


Pulse Ox  98   18 11:45














- Labs


Result Diagrams: 


 18 09:35





 18 09:35


Labs: 





 Laboratory Results - last 24 hr











  18





  09:35 09:35


 


WBC   11.5 H


 


RBC   4.51


 


Hgb   13.4


 


Hct   40.6


 


MCV   90.0


 


MCH   29.8


 


MCHC   33.1


 


RDW   14.0


 


Plt Count   281


 


MPV   9.8


 


Neut % (Auto)   80.2 H


 


Lymph % (Auto)   11.2 L


 


Mono % (Auto)   8.1


 


Eos % (Auto)   0.3


 


Baso % (Auto)   0.2


 


Neut # (Auto)   9.3 H


 


Lymph # (Auto)   1.3


 


Mono # (Auto)   0.9 H


 


Eos # (Auto)   0.0


 


Baso # (Auto)   0.0


 


Sodium  139 


 


Potassium  4.6 


 


Chloride  103 


 


Carbon Dioxide  25 


 


Anion Gap  16 


 


BUN  10 


 


Creatinine  0.5 L 


 


Est GFR ( Amer)  > 60 


 


Est GFR (Non-Af Amer)  > 60 


 


Random Glucose  107 H 


 


Calcium  9.0 


 


Total Bilirubin  1.0 


 


AST  22 


 


ALT  27 


 


Alkaline Phosphatase  77 


 


Total Protein  8.6 H 


 


Albumin  4.4 


 


Globulin  4.2 H 


 


Albumin/Globulin Ratio  1.1 














Assessment & Plan





- Assessment and Plan (Free Text)


Assessment: 





48 y/o female with a PMHx of RA admitted for suspected syncopal episode.





Plan:





1) Suspected Syncopal Episode


-likely neurally mediated 


-EKG: NSR, 90 BPM, normal axis, , QTc 447, no acute ST changes, no T-wave 

abnormalities as interpreted by me 


-Head CT: "no acute intracranial hemorrhage, chorioid plexus cysts are once 

again see, apparent pineal gland cyst slightly increased in size since prior 

study, recommened nonemergent MRI"


-Labs


-CBC/CMP wnl 


-UDS: negative


-24 hr cardiac monitoring


-maintain PO intake


-monitor 





2) Migraine Headache w/o Aura


-acute


-moderate pain


-72 hour duration, unilateral, associated with nausea and photophobia.


-no neurological symptoms 


-improving in ED as per pt, will continue monitoring


-reglan/zofran/toradol plus IV fluids given in ED





3) Rheumatoid Arthritis


-chronic


-c/w Xeljanz as ordered





4) Diet


-regular diet





5) Prophylaxis


-Lovenox 40mg SC QD


-Ambulation





6) Code Status


-full code

## 2018-03-25 NOTE — ED PDOC
Syncope/Near Syncope/Dizziness


Time Seen by Provider: 18 09:05


Chief Complaint (Nursing): Syncope


Chief Complaint (Provider): Syncope


History Per: Patient, EMS


History/Exam Limitations: no limitations


Onset/Duration Of Symptoms: Other (x last night)


Current Symptoms Are (Timing): Still Present


Additional Complaint(s): 





Ms. Nolan is a 47 year old female, with a history of migraines and 

rheumatoid arthritis, who presents to the ED via EMS complaining of syncopal 

episode while going to bathroom last night. Patient reports she fell to the 

floor injuring her left elbow. Unknown duration. Patient reports headache, 

proceeding episodes associated with nausea. Found by son on floor. Unknown head 

injury. Currently complaining of headache 











PMD: Edmund Wiggins





Past Medical History


Reviewed: Historical Data, Nursing Documentation, Vital Signs


Vital Signs: 





 Last Vital Signs











Temp  97 F L  18 08:40


 


Pulse  103 H  18 08:40


 


Resp  16   18 08:40


 


BP  134/84   18 08:40


 


Pulse Ox  99   18 08:40














- Medical History


PMH: Anemia, Migraine, Rheumatoid Arthritis


   Denies: Chronic Kidney Disease





- Surgical History


Surgical History: Cholecystectomy,  ( x 1)





- Family History


Family History: States: Unknown Family Hx





- Immunization History


Hx Tetanus Toxoid Vaccination: No (unsure)





- Home Medications


Home Medications: 


 Ambulatory Orders











 Medication  Instructions  Recorded


 


Tofacitinib Citrate [Xeljanz] 5 mg PO BID 18














- Allergies


Allergies/Adverse Reactions: 


 Allergies











Allergy/AdvReac Type Severity Reaction Status Date / Time


 


SEASONAL Allergy  CONGESTION Uncoded 18 08:40














Review of Systems


ROS Statement: Except As Marked, All Systems Reviewed And Found Negative


Gastrointestinal: Positive for: Nausea


Neurological: Positive for: Headache





Physical Exam





- Reviewed


Nursing Documentation Reviewed: Yes


Vital Signs Reviewed: Yes





- Physical Exam


Head Exam: Positive for: ATRAUMATIC, NORMOCEPHALIC


Eye Exam: Positive for: EOMI, PERRL


Neck: Positive for: Supple


Respiratory: Positive for: Normal Breath Sounds.  Negative for: Respiratory 

Distress


Back: Positive for: Normal Inspection.  Negative for: Vertebral Tenderness


Extremity: Positive for: Normal ROM, Other (Abrasion left elbow).  Negative for

: Tenderness, Deformity


Neurologic/Psych: Positive for: Alert, Oriented (x 3).  Negative for: Motor/

Sensory Deficits





- Laboratory Results


Result Diagrams: 


 18 09:35





 18 09:35





- ECG


O2 Sat by Pulse Oximetry: 99 (RA)


Pulse Ox Interpretation: Normal





Medical Decision Making


Medical Decision Making: 





Time: 09:24


Plan:


- CT Head w/o Contrast


- EKG


- CMP


- ED Urine Pregnancy


- ED Urine Dipstick


- CBC


- Sodium Chloride 0.9% 1,000 ml  mls/hr


- Reglan 10 mg IVP 


- Toradol 30 mg IVP 


- Zofran ODT 4 mg PO STAT


- Left Elbow X-Ray








10:47


Discussed case with family medicine resident. Will come to ED.











Time: 10:48


CT Head w/o Contrast


FINDINGS:





HEMORRHAGE:


No acute parenchymal, subarachnoid or extra-axial hemorrhage. 





BRAIN:


No evidence of a large acute infarct.  Choroid plexus cysts and represent a 

prominent pineal gland cyst again noted slightly increased in size from prior 

study. .  Followup nonemergent MRI of the brain could be performed for further 

evaluation. 





VENTRICLES:


No obstructive hydrocephalus. 





CALVARIUM:


No acute calvarial fractures.  There is a tiny radiopaque density within the 

left posterior superior parietal skin surface that may represent a small 

calcification





PARANASAL SINUSES:


Unremarkable as visualized. No significant inflammatory changes.





MASTOID AIR CELLS:


Unremarkable as visualized. No inflammatory changes.





OTHER FINDINGS:


None.





IMPRESSION:


No acute intracranial hemorrhage.  Choroid plexus cysts are again seen.  

Apparent pineal gland cyst slightly increased in size from prior exam.  

Followup nonemergent MRI suggested for further evaluation.














--------------------------------------------------------------------------------

-----------------


Scribe Attestation:


Documented by Mandeep Lugo, acting as a scribe for Orion Lowry MD





Provider Scribe Attestation:


All medical record entries made by the Scribe were at my direction and 

personally dictated by me. I have reviewed the chart and agree that the record 

accurately reflects my personal performance of the history, physical exam, 

medical decision making, and the department course for this patient. I have 

also personally directed, reviewed, and agree with the discharge instructions 

and disposition.





Disposition





- Clinical Impression


Clinical Impression: 


 Syncope








- Patient ED Disposition


Is Patient to be Admitted: Yes





- Disposition


Disposition Time: 11:02


Condition: FAIR


Forms:  [facility] (English)





- Pt Status Changed To:


Hospital Disposition Of: Observation





- POA


Present On Arrival: None

## 2018-03-26 VITALS
SYSTOLIC BLOOD PRESSURE: 123 MMHG | HEART RATE: 87 BPM | TEMPERATURE: 98 F | DIASTOLIC BLOOD PRESSURE: 80 MMHG | RESPIRATION RATE: 17 BRPM

## 2018-03-26 VITALS — OXYGEN SATURATION: 99 %

## 2018-03-26 NOTE — CP.PCM.DIS
Provider





- Provider


Date of Admission: 


03/25/18 10:53





Attending physician: 


Winifred Mathews MD





Primary care physician: 


NHVINAY


Time Spent in preparation of Discharge (in minutes): 30





Diagnosis





- Discharge Diagnosis


(1) Syncope


Status: Acute   





(2) Migraine


Status: Acute   





Hospital Course





- Lab Results


Lab Results: 


 Most Recent Lab Values











WBC  11.5 K/uL (4.8-10.8)  H  03/25/18  09:35    


 


RBC  4.51 Mil/uL (3.80-5.20)   03/25/18  09:35    


 


Hgb  13.4 g/dL (12.0-16.0)   03/25/18  09:35    


 


Hct  40.6 % (34.0-47.0)   03/25/18  09:35    


 


MCV  90.0 fl (81.0-99.0)   03/25/18  09:35    


 


MCH  29.8 pg (27.0-31.0)   03/25/18  09:35    


 


MCHC  33.1 g/dL (33.0-37.0)   03/25/18  09:35    


 


RDW  14.0 % (11.5-14.5)   03/25/18  09:35    


 


Plt Count  281 K/uL (130-400)   03/25/18  09:35    


 


MPV  9.8 fl (7.2-11.7)   03/25/18  09:35    


 


Neut % (Auto)  80.2 % (50.0-75.0)  H  03/25/18  09:35    


 


Lymph % (Auto)  11.2 % (20.0-40.0)  L  03/25/18  09:35    


 


Mono % (Auto)  8.1 % (0.0-10.0)   03/25/18  09:35    


 


Eos % (Auto)  0.3 % (0.0-4.0)   03/25/18  09:35    


 


Baso % (Auto)  0.2 % (0.0-2.0)   03/25/18  09:35    


 


Neut # (Auto)  9.3 K/uL (1.8-7.0)  H  03/25/18  09:35    


 


Lymph # (Auto)  1.3 K/uL (1.0-4.3)   03/25/18  09:35    


 


Mono # (Auto)  0.9 K/uL (0.0-0.8)  H  03/25/18  09:35    


 


Eos # (Auto)  0.0 K/uL (0.0-0.7)   03/25/18  09:35    


 


Baso # (Auto)  0.0 K/uL (0.0-0.2)   03/25/18  09:35    


 


Sodium  139 mmol/l (132-148)   03/25/18  09:35    


 


Potassium  4.6 MMOL/L (3.6-5.0)   03/25/18  09:35    


 


Chloride  103 mmol/L ()   03/25/18  09:35    


 


Carbon Dioxide  25 mmol/L (22-30)   03/25/18  09:35    


 


Anion Gap  16  (10-20)   03/25/18  09:35    


 


BUN  10 mg/dl (7-17)   03/25/18  09:35    


 


Creatinine  0.5 mg/dl (0.7-1.2)  L  03/25/18  09:35    


 


Est GFR ( Amer)  > 60   03/25/18  09:35    


 


Est GFR (Non-Af Amer)  > 60   03/25/18  09:35    


 


Random Glucose  107 mg/dL ()  H  03/25/18  09:35    


 


Calcium  9.0 mg/dL (8.4-10.2)   03/25/18  09:35    


 


Total Bilirubin  1.0 mg/dl (0.2-1.3)   03/25/18  09:35    


 


AST  22 U/L (14-36)   03/25/18  09:35    


 


ALT  27 U/L (9-52)   03/25/18  09:35    


 


Alkaline Phosphatase  77 U/L ()   03/25/18  09:35    


 


Total Protein  8.6 G/DL (6.3-8.2)  H  03/25/18  09:35    


 


Albumin  4.4 g/dL (3.5-5.0)   03/25/18  09:35    


 


Globulin  4.2 gm/dL (2.2-3.9)  H  03/25/18  09:35    


 


Albumin/Globulin Ratio  1.1  (1.0-2.1)   03/25/18  09:35    


 


Urine Opiates Screen  Negative  (NEGATIVE)   03/25/18  14:38    


 


Urine Methadone Screen  Negative  (NEGATIVE)   03/25/18  14:38    


 


Ur Barbiturates Screen  Negative  (NEGATIVE)   03/25/18  14:38    


 


Ur Phencyclidine Scrn  Negative  (NEGATIVE)   03/25/18  14:38    


 


Ur Amphetamines Screen  Negative  (NEGATIVE)   03/25/18  14:38    


 


U Benzodiazepines Scrn  Negative  (NEGATIVE)   03/25/18  14:38    


 


U Oth Cocaine Metabols  Negative  (NEGATIVE)   03/25/18  14:38    


 


U Cannabinoids Screen  Negative  (NEGATIVE)   03/25/18  14:38    














- Hospital Course


Hospital Course: 





An Oorzco is a 48 yo F with PMH rheumatoid arthritis and migraines; 

presented to ED after episode of unwitnessed suspected syncope and migraine. 

Head CT did not show acute bleed, EKG and remainder of workup in ED was 

unremarkable. Pt was monitored for 24 hrs, no reoccurrence of event; syncope 

likely neurally mediated. She was treated with toradol and acetaminophen, 

decadron and magensium sulfate for her headache.  At this time, she is stable 

for discharge, and she has a follow up appointment in the [facility] this week.


It is recommended that she get outpatient MRI, as well as a neurology referral 

for possible abortive therapy for her migraines.   





Discharge Exam





- Head Exam


Head Exam: ATRAUMATIC, NORMAL INSPECTION, NORMOCEPHALIC





- Eye Exam


Eye Exam: EOMI, Normal appearance





- ENT Exam


ENT Exam: Mucous Membranes Moist





- Neck Exam


Neck exam: Full Rom





- Respiratory Exam


Respiratory Exam: Clear to PA & Lateral, NORMAL BREATHING PATTERN, UNREMARKABLE





- Cardiovascular Exam


Cardiovascular Exam: REGULAR RHYTHM, +S1, +S2





- GI/Abdominal Exam


GI & Abdominal Exam: Normal Bowel Sounds, Unremarkable





- Extremities Exam


Extremities exam: normal capillary refill, normal inspection


Additional comments: 





no pedal edema


no deformities in extremities





- Back Exam


Back exam: NORMAL INSPECTION





- Neurological Exam


Neurological exam: Alert, Oriented x3





- Psychiatric Exam


Psychiatric exam: Normal Mood





- Skin


Skin Exam: Dry, Normal Color, Warm





Discharge Plan





- Follow Up Plan


Condition: STABLE


Disposition: HOME/ ROUTINE


Instructions:  Migraine Headache (DC), Syncope (DC)


Additional Instructions: 


Please follow up at appointment with PMD at [facility] as 

scheduled.


Please return to ED if symptoms worsen, are unrelenting.


Referrals: 


[email or web address] [Provider Group] - 03/29/18 11:20 am

## 2018-03-26 NOTE — CARD
--------------- APPROVED REPORT --------------





EKG Measurement

Heart [id number]

[id number]

[id number]

[id number]

[id number]



<Conclusion>

Normal sinus rhythm

Normal ECG

## 2018-07-31 ENCOUNTER — HOSPITAL ENCOUNTER (OUTPATIENT)
Dept: HOSPITAL 14 - H.OPSURG | Age: 48
Discharge: HOME | End: 2018-07-31
Attending: OBSTETRICS & GYNECOLOGY
Payer: COMMERCIAL

## 2018-07-31 VITALS
HEART RATE: 92 BPM | DIASTOLIC BLOOD PRESSURE: 78 MMHG | TEMPERATURE: 97.5 F | SYSTOLIC BLOOD PRESSURE: 123 MMHG | OXYGEN SATURATION: 96 %

## 2018-07-31 VITALS — RESPIRATION RATE: 18 BRPM

## 2018-07-31 VITALS — BODY MASS INDEX: 25.7 KG/M2

## 2018-07-31 DIAGNOSIS — G89.29: ICD-10-CM

## 2018-07-31 DIAGNOSIS — M06.9: ICD-10-CM

## 2018-07-31 DIAGNOSIS — M54.9: ICD-10-CM

## 2018-07-31 DIAGNOSIS — R10.2: Primary | ICD-10-CM

## 2018-07-31 DIAGNOSIS — N73.6: ICD-10-CM

## 2018-07-31 LAB
ERYTHROCYTE [DISTWIDTH] IN BLOOD BY AUTOMATED COUNT: 13.1 % (ref 11.5–14.5)
HGB BLD-MCNC: 13.1 G/DL (ref 12–16)
MCH RBC QN AUTO: 30.7 PG (ref 27–31)
MCHC RBC AUTO-ENTMCNC: 34.2 G/DL (ref 33–37)
MCV RBC AUTO: 89.6 FL (ref 81–99)
PLATELET # BLD: 326 K/UL (ref 130–400)
RBC # BLD AUTO: 4.27 MIL/UL (ref 3.8–5.2)
WBC # BLD AUTO: 7.2 K/UL (ref 4.8–10.8)

## 2018-07-31 PROCEDURE — 86900 BLOOD TYPING SEROLOGIC ABO: CPT

## 2018-07-31 PROCEDURE — 85027 COMPLETE CBC AUTOMATED: CPT

## 2018-07-31 PROCEDURE — 86850 RBC ANTIBODY SCREEN: CPT

## 2018-07-31 PROCEDURE — 86870 RBC ANTIBODY IDENTIFICATION: CPT

## 2018-07-31 PROCEDURE — 36415 COLL VENOUS BLD VENIPUNCTURE: CPT

## 2018-07-31 PROCEDURE — 58350 REOPEN FALLOPIAN TUBE: CPT

## 2018-08-02 NOTE — OP
Copied To:  Rose Brower MD

Attending MD:  Rose Brower MD



PROCEDURE DATE:  07/31/18



PREOPERATIVE DIAGNOSIS:  Chronic pelvic pain.



POSTOPERATIVE DIAGNOSIS:  Strain with abdominal pelvic adhesions noted.



PROCEDURE:  Attempted laparoscopy.



SURGEON:  Rose Brower MD.



ASSISTANT:  Orion Gary MD.



TYPE OF ANESTHESIA:  General.



OPERATIVE FINDINGS:  Bimanual exam revealed an anteverted uterus of normal

size with normal adnexa palpated.  Upon attempted laparoscopy, abdominal

pelvic adhesions were noted which caused difficulty to descend into the

pelvic cavity with the scope.



ESTIMATED BLOOD LOSS:  20 mL.



INTRAVENOUS FLUIDS:  1 L.



URINE OUTPUT:  250 mL clear at the end of procedure.



INDICATION:  This is a 48-year-old female with a history of chronic pelvic

pain and intermittent ovarian cyst.  The patient had two simple cysts, less

than 2 cm in size noted on previous sono; however, the patient's hip pain

did not correlate with findings on previous ultrasound, as the pain was

persisted.  With persistence, the patient was recommended to undergo a

diagnostic laparoscopy.  All risks and benefits of the surgery had been

discussed with the patient in the clinic and previous to the OR date, and

the consent was signed.  The patient also indicated that she desired a

laparoscopy for diagnostic process, but did not desire any further invasive

procedures.



DESCRIPTION OF PROCEDURE:  The patient was taken to the operating room and

placed under general anesthesia without difficulty, she was then prepped

and draped in the usual sterile fashion and placed in the dorsal lithotomy

position.  A preoperative exam revealed findings as above.  Attention was

then turned to the vagina, where weighted speculum was placed in the

vagina.  The anterior lip of the cervix was grasped with a single-tooth

tenaculum, the cannula of the uterine manipulator.  The cervix was then

serially dilated to allow entry of the uterine manipulator, the uterus

sounded to approximately 7 cm and the uterine manipulator was then inserted

into the cervix and the weighted speculum was removed.  The bladder was

then drained using a Nieto catheter that was placed in the bladder. 

Attention was then turned to the abdomen where an approximately 10 mm skin

incision was made in the umbilical port.  Entry into the peritoneum was

then performed bluntly.  The fascia was then identified and incised with a

scalpel and the incision was extended using Jazmyne clamp.  At this time, a

10 mm trocar was then placed to the umbilical incision and confirmation of

intraabdominal placement was attempted with the camera.  As the camera was

inserted into the abdominal cavity, multiple adhesions including back of

the omentum was noted attached to the anterior abdominal wall.  The camera

was then also pointed to pelvic cavity and multiple adhesions were noted at

this time as well.  As the patient did not desire any further invasive

procedures, the camera and the trocar were then removed at this time, and

the fascia was then closed with 3 interrupted sutures 0 Vicryl, the skin 3

interrupted sutures of 3-0 plain was then also placed in the subcutaneous

fat for re-approximation on this layer.  The skin was closed with 4-0

Monocryl with a subcuticular stitch and the umbilical incision was covered

with Steri-Strips and a sterile dressing.  Attention was then turned to the

vagina at which time the uterine manipulator was removed without difficulty

and the Nieto catheter was also removed.  The patient was then

re-positioned, cleaned and awakened from anesthesia and transferred to the

recovery room in stable condition.  The decision to avoid the procedure as

multiple adhesions were noted was discussed with the patient and the

patient will return to the clinic in 2 weeks for postoperative followup and

to discuss future definitive treatment.  Due to the nature of this case, an

assistant was also requested.  My assistant aided in providing good

exposure to decrease blood loss, he also assisted with attempted entry into

the pelvic cavity as multiple adhesions were noted and he assisted with

closure of the layers of the abdominal wall in the umbilical incision

including the fascia, peritoneum, and skin.





__________________________________________

Rose Brower MD





DD:  08/01/2018 18:03:05

DT:  08/02/2018 3:11:19

Job # [id number]

## 2018-08-06 ENCOUNTER — HOSPITAL ENCOUNTER (OUTPATIENT)
Dept: HOSPITAL 14 - H.ER | Age: 48
Setting detail: OBSERVATION
LOS: 2 days | Discharge: HOME | End: 2018-08-08
Attending: FAMILY MEDICINE | Admitting: FAMILY MEDICINE
Payer: COMMERCIAL

## 2018-08-06 VITALS — BODY MASS INDEX: 27.4 KG/M2

## 2018-08-06 DIAGNOSIS — G43.909: ICD-10-CM

## 2018-08-06 DIAGNOSIS — R20.0: ICD-10-CM

## 2018-08-06 DIAGNOSIS — R00.0: ICD-10-CM

## 2018-08-06 DIAGNOSIS — M06.9: ICD-10-CM

## 2018-08-06 DIAGNOSIS — R07.89: Primary | ICD-10-CM

## 2018-08-06 DIAGNOSIS — K59.00: ICD-10-CM

## 2018-08-06 LAB
ALBUMIN SERPL-MCNC: 4.2 G/DL (ref 3.5–5)
ALBUMIN/GLOB SERPL: 1.1 {RATIO} (ref 1–2.1)
ALT SERPL-CCNC: 30 U/L (ref 9–52)
APTT BLD: 34 SECONDS (ref 25.6–37.1)
AST SERPL-CCNC: 30 U/L (ref 14–36)
BASOPHILS # BLD AUTO: 0 K/UL (ref 0–0.2)
BASOPHILS NFR BLD: 0.3 % (ref 0–2)
BNP SERPL-MCNC: 30.9 PG/ML (ref 0–450)
BUN SERPL-MCNC: 15 MG/DL (ref 7–17)
CALCIUM SERPL-MCNC: 8.6 MG/DL (ref 8.4–10.2)
EOSINOPHIL # BLD AUTO: 0.1 K/UL (ref 0–0.7)
EOSINOPHIL NFR BLD: 1.8 % (ref 0–4)
ERYTHROCYTE [DISTWIDTH] IN BLOOD BY AUTOMATED COUNT: 13.1 % (ref 11.5–14.5)
GFR NON-AFRICAN AMERICAN: > 60
HGB BLD-MCNC: 13 G/DL (ref 12–16)
INR PPP: 1.1
LYMPHOCYTES # BLD AUTO: 2.3 K/UL (ref 1–4.3)
LYMPHOCYTES NFR BLD AUTO: 30.3 % (ref 20–40)
MCH RBC QN AUTO: 30.1 PG (ref 27–31)
MCHC RBC AUTO-ENTMCNC: 33.4 G/DL (ref 33–37)
MCV RBC AUTO: 90 FL (ref 81–99)
MONOCYTES # BLD: 0.7 K/UL (ref 0–0.8)
MONOCYTES NFR BLD: 8.6 % (ref 0–10)
NEUTROPHILS # BLD: 4.5 K/UL (ref 1.8–7)
NEUTROPHILS NFR BLD AUTO: 59 % (ref 50–75)
NRBC BLD AUTO-RTO: 0 % (ref 0–0)
PLATELET # BLD: 378 K/UL (ref 130–400)
PMV BLD AUTO: 9 FL (ref 7.2–11.7)
PROTHROMBIN TIME: 12 SECONDS (ref 9.8–13.1)
RBC # BLD AUTO: 4.33 MIL/UL (ref 3.8–5.2)
WBC # BLD AUTO: 7.7 K/UL (ref 4.8–10.8)

## 2018-08-06 PROCEDURE — 83735 ASSAY OF MAGNESIUM: CPT

## 2018-08-06 PROCEDURE — 84100 ASSAY OF PHOSPHORUS: CPT

## 2018-08-06 PROCEDURE — 86146 BETA-2 GLYCOPROTEIN ANTIBODY: CPT

## 2018-08-06 PROCEDURE — 85610 PROTHROMBIN TIME: CPT

## 2018-08-06 PROCEDURE — 93970 EXTREMITY STUDY: CPT

## 2018-08-06 PROCEDURE — 84443 ASSAY THYROID STIM HORMONE: CPT

## 2018-08-06 PROCEDURE — 81025 URINE PREGNANCY TEST: CPT

## 2018-08-06 PROCEDURE — 81241 F5 GENE: CPT

## 2018-08-06 PROCEDURE — 96372 THER/PROPH/DIAG INJ SC/IM: CPT

## 2018-08-06 PROCEDURE — 85730 THROMBOPLASTIN TIME PARTIAL: CPT

## 2018-08-06 PROCEDURE — 85300 ANTITHROMBIN III ACTIVITY: CPT

## 2018-08-06 PROCEDURE — 85025 COMPLETE CBC W/AUTO DIFF WBC: CPT

## 2018-08-06 PROCEDURE — 82746 ASSAY OF FOLIC ACID SERUM: CPT

## 2018-08-06 PROCEDURE — 81291 MTHFR GENE: CPT

## 2018-08-06 PROCEDURE — 84484 ASSAY OF TROPONIN QUANT: CPT

## 2018-08-06 PROCEDURE — 80053 COMPREHEN METABOLIC PANEL: CPT

## 2018-08-06 PROCEDURE — 86147 CARDIOLIPIN ANTIBODY EA IG: CPT

## 2018-08-06 PROCEDURE — 71275 CT ANGIOGRAPHY CHEST: CPT

## 2018-08-06 PROCEDURE — 36415 COLL VENOUS BLD VENIPUNCTURE: CPT

## 2018-08-06 PROCEDURE — 71046 X-RAY EXAM CHEST 2 VIEWS: CPT

## 2018-08-06 PROCEDURE — 93306 TTE W/DOPPLER COMPLETE: CPT

## 2018-08-06 PROCEDURE — 85290 CLOT FACTOR XIII FIBRIN STAB: CPT

## 2018-08-06 PROCEDURE — 83880 ASSAY OF NATRIURETIC PEPTIDE: CPT

## 2018-08-06 PROCEDURE — 93005 ELECTROCARDIOGRAM TRACING: CPT

## 2018-08-06 PROCEDURE — 86148 ANTI-PHOSPHOLIPID ANTIBODY: CPT

## 2018-08-06 PROCEDURE — 99285 EMERGENCY DEPT VISIT HI MDM: CPT

## 2018-08-06 PROCEDURE — 78582 LUNG VENTILAT&PERFUS IMAGING: CPT

## 2018-08-06 PROCEDURE — 82607 VITAMIN B-12: CPT

## 2018-08-06 NOTE — RAD
Date of service: 



08/06/2018



HISTORY:

Chest pain, tightness.  On specified recent surgery  



COMPARISON:

03/25/2018



TECHNIQUE:

Chest PA and lateral



FINDINGS:



LUNGS:

No active pulmonary disease.



PLEURA:

No significant pleural effusion identified. No pneumothorax apparent.



CARDIOVASCULAR:

Normal.



OSSEOUS STRUCTURES:

No significant abnormalities.



VISUALIZED UPPER ABDOMEN:

Normal.



OTHER FINDINGS:

None.



IMPRESSION:

No active disease. No significant interval change compared to the 

prior examination(s).

## 2018-08-06 NOTE — CT
Date of service: 



08/06/2018



PROCEDURE:  CT Chest with contrast (Pulmonary Angiogram)



HISTORY:

CP SOB r/o PE



COMPARISON:

Correlation made with chest radiograph obtained earlier same day.  

Comparison also made with prior CTA chest 08/15/2015.



TECHNIQUE:

Axial computed tomography images were obtained of the chest in the 

pulmonary arterial phase of enhancement. Coronal and sagittal 

reformatted images were created and reviewed.



Intravenous contrast dose: 70 cc Visipaque 320



Radiation dose:



Total exam DLP = 253.17 mGy-cm.



This CT exam was performed using one or more of the following dose 

reduction techniques: Automated exposure control, adjustment of the 

mA and/or kV according to patient size, and/or use of iterative 

reconstruction technique.



FINDINGS:



PULMONARY ARTERIES:

There is a small focal area of diminished contrast within right upper 

lobe segmental branch best seen on axial image number 63- 69 that 

could represent volume averaging artifact though the small on embolus 

in this location cannot be excluded although the former is favored. . 

 Clinical correlation recommended. No other definitive filling 

defects are identified. Pulmonary trunk measures approximately 2.7 cm 



AORTA:

No acute findings. No thoracic aortic aneurysm. Ascending thoracic 

aorta measures approximately 2.5 cm.  Descending thoracic aorta 

measures approximately 2.0 cm. 



LUNGS:

Mild passive/dependent type atelectasis seen in both posterior lower 

lung fields. 



PLEURAL SPACES:

Unremarkable. No effusion or pneumothorax. 



HEART:

Heart size is within range of normal. No significant pericardial 

effusion. 



LYMPH NODES:

No no significant mediastinal or hilar lymphadenopathy.



BONES, CHEST WALL:

Unremarkable. No fracture or destructive lesion 



There is a small hiatal hernia 



OTHER FINDINGS:

Cholecystectomy. .  Punctate calcifications seen upper pole 

collecting system left kidney. 



IMPRESSION:

There is a small area of diminished contrast enhancement within 

segmental branch right upper lobe which could represent volume 

averaging artifact however the possibility of a small pulmonary 

embolus cannot be excluded although the former is favored.  Clinical 

correlation recommended. 



Mild passive/dependent type atelectasis both posterior lower lung 

zones

## 2018-08-06 NOTE — CP.PCM.HP
History of Present Illness





- History of Present Illness


History of Present Illness: 





49 YO F w/ PMH of Rheumatoid arthritis, migraine, ovarian cysts s/p laproscopic 

ovarian cyst surgery ( 18) presents for chest pain, SOB, numbness of her 

tongue and voice hoarseness since POD 1 of her surgery. 


- Patient states that chest pain is sharp 6/10, characterizes it as a "

squeezing type of pain". Chest pain is associated with SOB and palpitations. 

Admits to worsening SOB and palpation on exertion.  Denies any extremity 

tenderness or swelling. Patient does not have any previous history of DVT or PE.


- She is also concerned about the numbness of her tongue. Initially states her 

whole tongue was numb. Now has regained sensation on the posterior half of the 

tongue, however continues to have decreased sensation on anterior portion of 

her tongue. 











PMH: Rheumatoid arthritis, Ovarian cyst s/p lap ovarian cyst surg


Surgical history: Cholecystectomy, c-sectin,  Adenoids resection, endometrial 

ablation, laproscopic ovarian cyst surgery ( 18) 


                  


Allergy: NKDA


OBHX: , 2  1 


GYN: LMP: 2018


FamilyHx: mother multiple myeloma, HTN, CAD,RA


Medications: Ibuprofen 800 mg


Denies : Social drinker, denies smoking or illicit drug use, 


PMD: 


GYN: 





Next of Kin: Mother Naa


Code Status: full code 





Present on Admission





- Present on Admission


Any Indicators Present on Admission: Yes


History of DVT/PE: Yes





- Notes:


Notes:: 





First episode of PE present on admission





Review of Systems





- Review of Systems


All systems: reviewed and no additional remarkable complaints except





Past Patient History





- Infectious Disease


Hx of Infectious Diseases: None





- Tetanus Immunizations


Tetanus Immunization: Unknown





- Past Medical History & Family History


Past Medical History?: Yes





- Past Social History


Alcohol: None


Drugs: Denies





- CARDIAC


Hx Cardiac Disorders: No





- PULMONARY


Hx Respiratory Disorders: No





- NEUROLOGICAL


Hx Migraine: Yes





- HEENT


Hx HEENT Problems: No





- RENAL


Hx Chronic Kidney Disease: No





- ENDOCRINE/METABOLIC


Hx Endocrine Disorders: No





- HEMATOLOGICAL/ONCOLOGICAL


Hx Anemia: Yes





- INTEGUMENTARY


Hx Dermatological Problems: No





- MUSCULOSKELETAL/RHEUMATOLOGICAL


Hx Rheumatoid Arthritis: Yes





- GASTROINTESTINAL


Hx Gastrointestinal Disorders: No





- GENITOURINARY/GYNECOLOGICAL


Hx Genitourinary Disorders: Yes


Other/Comment: WHEN MUCH YOUNGER HAD SURGERY FOR LEAKING BLADDER





- PSYCHIATRIC


Hx Physical Abuse: No


Hx Substance Use: No





- SURGICAL HISTORY


Hx Cholecystectomy: Yes


Hx Tonsillectomy: Yes





- ANESTHESIA


Hx Anesthesia: Yes


Hx Anesthesia Reactions: No


Hx Malignant Hyperthermia: No





Meds


Allergies/Adverse Reactions: 


 Allergies











Allergy/AdvReac Type Severity Reaction Status Date / Time


 


SEASONAL Allergy  CONGESTION Uncoded 18 15:14














Physical Exam





- Constitutional


Appears: No Acute Distress





- Head Exam


Head Exam: NORMAL INSPECTION





- Eye Exam


Eye Exam: Normal appearance





- Respiratory Exam


Respiratory Exam: Clear to Auscultation Bilateral, NORMAL BREATHING PATTERN.  

absent: Rhonchi, Wheezes





- Cardiovascular Exam


Cardiovascular Exam: REGULAR RHYTHM, RRR, +S1, +S2.  absent: Tachycardia, 

Irregular Rhythm





- GI/Abdominal Exam


GI & Abdominal Exam: Normal Bowel Sounds, Soft, Tenderness (periumbilical 

tendernss. Left lower quadrant tendernes on palpation)


Additional comments: 





Steri strips C/D/I . No discharge noted. Greenish colored Contusion noted 

inferior to umbilicus





- Extremities Exam


Extremities exam: Positive for: normal inspection, pedal pulses present.  

Negative for: calf tenderness, pedal edema





- Neurological Exam


Neurological exam: Alert, CN II-XII Intact, Oriented x3


Additional comments: 





Cranial nerves intact. Sensation over tounge intact however slight  Decrease 

sensation on anterior 1/3 of tounge


Motor grossly intact





- Skin


Skin Exam: Warm





Results





- Vital Signs


Recent Vital Signs: 





 Last Vital Signs











Temp  98 F   18 15:14


 


Pulse  92 H  18 20:40


 


Resp  16   18 16:09


 


BP  122/79   18 15:14


 


Pulse Ox  96   18 20:40














- Labs


Result Diagrams: 


 18 16:15





 18 16:15


Labs: 





 Laboratory Results - last 24 hr











  18





  16:15 16:15 16:15


 


WBC   7.7 


 


RBC   4.33 


 


Hgb   13.0 


 


Hct   39.0 


 


MCV   90.0 


 


MCH   30.1 


 


MCHC   33.4 


 


RDW   13.1 


 


Plt Count   378 


 


MPV   9.0 


 


Neut % (Auto)   59.0 


 


Lymph % (Auto)   30.3 


 


Mono % (Auto)   8.6 


 


Eos % (Auto)   1.8 


 


Baso % (Auto)   0.3 


 


Neut # (Auto)   4.5 


 


Lymph # (Auto)   2.3 


 


Mono # (Auto)   0.7 


 


Eos # (Auto)   0.1 


 


Baso # (Auto)   0.0 


 


PT    12.0


 


INR    1.1


 


APTT    34.0


 


Sodium  138  


 


Potassium  4.5  


 


Chloride  104  


 


Carbon Dioxide  26  


 


Anion Gap  13  


 


BUN  15  


 


Creatinine  0.6 L  


 


Est GFR ( Amer)  > 60  


 


Est GFR (Non-Af Amer)  > 60  


 


Random Glucose  96  


 


Calcium  8.6  


 


Total Bilirubin  0.6  


 


AST  30  


 


ALT  30  


 


Alkaline Phosphatase  68  


 


Troponin I  0.0250  


 


NT-Pro-B Natriuret Pep  30.9  


 


Total Protein  7.9  


 


Albumin  4.2  


 


Globulin  3.7  


 


Albumin/Globulin Ratio  1.1  














Assessment & Plan





- Assessment and Plan (Free Text)


Assessment: 





49 y/o female with a PMHx of RA, ovarian cyst s/p laparoscopic ovarian cyst 

surgery performed on 2018 admitted for possible pulmonary embolism. 





Plan:





1) Chest pain and SOB


- Possibly secondary to  Pulmonary embolism. S/P laproscopic ovarian surgery 6 

days ago. Denies immobility after surgery  states she has been up and out of 

bed.


-EKG: NSR, 92 BPM, normal axis, no acute ST changes, no T-wave abnormalities as 

interpreted by me 


- Troponin x 1 negative, Pro BNP: WNL


- CT chest : There is a small area of diminished contrast enhancement within 

segmental branch right upper lobe which could represent volume averaging 

artifact however the possibility of a small pulmonary embolus cannot be 

excluded although the former is favored.  Clinical correlation recommended. 


- Chest X ray WNL


- C/W Therapeutic lovenox 68 mg Q12. Possibly VQ scan in the am since CT was 

inconclusive


- Hypercoagulable workup ordered: Anticardiolipin antibody, Antithrombin III 

activity, Antiphospholipid antibody, Factor V leiden Gene mutation, Factor VIII 

activity, MTHFR gene


- Monitor on telemetry





2 ) Numbness of anterior portion of tongue  and hoarseness of voice ( improving)


- possibly secondary to post intubation nerve injury


- continue to monitor. 


- Further workup outpatient





3) DVT Prophylaxis


-Patient currently on therapeutic anticoagulation





4) Code Status


-full code

## 2018-08-06 NOTE — ED PDOC
HPI: Chest Pain


Time Seen by Provider: 18 15:35


Chief Complaint (Nursing): Shortness Of Breath


Chief Complaint (Provider): Chest Pain and SOB


History Per: Patient


History/Exam Limitations: no limitations


Onset/Duration Of Symptoms: Days (x 6 )


Current Symptoms Are (Timing): Still Present


Quality: "Pain"


Exacerbating Factors: Exertion


Additional Complaint(s): 


48 year old female with a history of rheumatoid arthritis, ovarian cysts and 

migraines presents to the ED with chest pain, shortness of breath, numbness of 

her tongue and voice hoarseness for 5 days. Patient had laparoscopic ovarian 

cyst surgery performed on 2018. The day after, she developed the symptoms. 

Pain is located in upper mid sternal area of her chest, has become 

progressively worse with time and is exacerbated with exertion. She reports a 

cough especially when short of breath with exertion. Patient spoke to her 

doctor and was advised to visit the ED. Denies phlegm, fever, hemoptysis, leg 

swelling and calf pain. 








PMD: [facility] 








Past Medical History


Reviewed: Historical Data, Nursing Documentation, Vital Signs


Vital Signs: 


 Last Vital Signs











Temp  98 F   18 15:14


 


Pulse  92 H  18 16:22


 


Resp  16   18 16:09


 


BP  122/79   18 15:14


 


Pulse Ox  96   18 16:22














- Medical History


PMH: Anemia, Migraine, Rheumatoid Arthritis


   Denies: Chronic Kidney Disease





- Surgical History


Surgical History: Cholecystectomy, Tonsillectomy,  ( x 1)


Other surgeries: endometrial ablation





- Family History


Family History: States: CAD, Hypertension


Other Family History: multiple myeloma





- Social History


Current smoker - smoking cessation education provided: No


Alcohol: None


Drugs: Denies





- Immunization History


Hx Tetanus Toxoid Vaccination: No (unsure)





- Home Medications


Home Medications: 


 Ambulatory Orders











 Medication  Instructions  Recorded


 


Ibuprofen [Motrin Tab] 800 mg PO Q8 PRN 18














- Allergies


Allergies/Adverse Reactions: 


 Allergies











Allergy/AdvReac Type Severity Reaction Status Date / Time


 


SEASONAL Allergy  CONGESTION Uncoded 18 15:14














Review of Systems


ROS Statement: Except As Marked, All Systems Reviewed And Found Negative


Constitutional: Negative for: Fever


ENT: Positive for: Other (tongue numbness and voice hoarse)


Cardiovascular: Positive for: Chest Pain


Respiratory: Positive for: Cough (when short of breath ), Shortness of Breath, 

SOB with Exertion.  Negative for: Hemoptysis, Sputum


Musculoskeletal: Negative for: Leg Pain (or leg swelling )





Physical Exam





- Reviewed


Nursing Documentation Reviewed: Yes


Vital Signs Reviewed: Yes





- Physical Exam


Appears: Positive for: Well, No Acute Distress


Head Exam: Positive for: ATRAUMATIC, NORMAL INSPECTION, NORMOCEPHALIC


Skin: Positive for: Normal Color, Warm, Dry


Eye Exam: Positive for: EOMI, Normal appearance, PERRL


ENT: Positive for: Pharynx Is (clear; no erythema or exudates), Other (

decreased light touch to distal tongue bilaterally; uvula is midline; voice is 

hoarse).  Negative for: Tonsillar Exudate


Neck: Positive for: Normal, Painless ROM, Supple


Respiratory: Positive for: Normal Breath Sounds.  Negative for: Respiratory 

Distress


Extremity: Positive for: Normal ROM.  Negative for: Deformity


Neurologic/Psych: Positive for: Alert, Oriented (x 3).  Negative for: Motor/

Sensory Deficits





- Laboratory Results


Result Diagrams: 


 18 16:15





 18 16:15





- ECG


ECG Rhythm: Positive for: Sinus Rhythm (normal ), ST/T Changes


Interpretation Of Abn EKG: St wave inversion in inferior leads; Finding is new 

compared to previous EKG in March where inversion was only seen at lead 3


Rate: 92


O2 Sat by Pulse Oximetry: 96 (RA)


Pulse Ox Interpretation: Normal





Medical Decision Making


Medical Decision Making: 


15:48 


Impression: chest pain and tongue numbness s/p ovarian cyst surgery 


Differential diagnoses include but are not limited to: post intubation nerve 

injury, PE, myocardial ischemia, ACD, post intubation laryngeal irritation


Initial Plan: 


--EKG 


--BNP 


--Troponin 


--CBC 


--CMP 


--Urine preg 


--Urine dip 


--PTT 


--Prothrombin


--CXR 


 





Accession No. : [id number]


Patient Name / ID : KEELY MARTINEZ  / [id number]


Exam Date : 2018 17:43:26 ( Approved )


Study Comment : 


Sex / Age : F  / 48Y





Creator : Osman Mckenna MD


Dictator : 


 : 


Approver : Osman Mckenna MD


Approver2 : 





Report Date : 2018 18:29:23


My Comment : 


********************************************************************************

***





Date of service: 





2018





PROCEDURE:  CT Chest with contrast (Pulmonary Angiogram)





HISTORY:


CP SOB r/o PE





COMPARISON:


Correlation made with chest radiograph obtained earlier same day.  Comparison 

also made with prior CTA chest 08/15/2015.





TECHNIQUE:


Axial computed tomography images were obtained of the chest in the pulmonary 

arterial phase of enhancement. Coronal and sagittal reformatted images were 

created and reviewed.





Intravenous contrast dose: 70 cc Visipaque 320





Radiation dose:





Total exam DLP = 253.17 mGy-cm.





This CT exam was performed using one or more of the following dose reduction 

techniques: Automated exposure control, adjustment of the mA and/or kV 

according to patient size, and/or use of iterative reconstruction technique.





FINDINGS:





PULMONARY ARTERIES:


There is a small focal area of diminished contrast within right upper lobe 

segmental branch best seen on axial image number 63- 69 that could represent 

volume averaging artifact though the small on embolus in this location cannot 

be excluded although the former is favored. .  Clinical correlation 

recommended. No other definitive filling defects are identified. Pulmonary 

trunk measures approximately 2.7 cm 





AORTA:


No acute findings. No thoracic aortic aneurysm. Ascending thoracic aorta 

measures approximately 2.5 cm.  Descending thoracic aorta measures 

approximately 2.0 cm. 





LUNGS:


Mild passive/dependent type atelectasis seen in both posterior lower lung 

fields. 





PLEURAL SPACES:


Unremarkable. No effusion or pneumothorax. 





HEART:


Heart size is within range of normal. No significant pericardial effusion. 





LYMPH NODES:


No no significant mediastinal or hilar lymphadenopathy.





BONES, CHEST WALL:


Unremarkable. No fracture or destructive lesion 





There is a small hiatal hernia 





OTHER FINDINGS:


Cholecystectomy. .  Punctate calcifications seen upper pole collecting system 

left kidney. 





IMPRESSION:


There is a small area of diminished contrast enhancement within segmental 

branch right upper lobe which could represent volume averaging artifact however 

the possibility of a small pulmonary embolus cannot be excluded although the 

former is favored.  Clinical correlation recommended. 





Mild passive/dependent type atelectasis both posterior lower lung zones





 


--------------------------------------------------------------------------------

----


Scribe Attestation: 


Documented by Jordana Encinas, acting as a scribe for Franchesca Kendrick MD





Provider Scribe Attestation:


All medical record entries made by the Scribe were at my direction and 

personally dictated by me. I have reviewed the chart and agree that the record 

accurately reflects my personal performance of the history, physical exam, 

medical decision making, and the department course for this patient. I have 

also personally directed, reviewed, and agree with the discharge instructions 

and disposition.





Disposition





- Clinical Impression


Clinical Impression: 


 Pulmonary embolism





Counseled Patient/Family Regarding: Studies Performed, Diagnosis





- Disposition


Disposition Time: 20:00


Condition: FAIR

## 2018-08-07 RX ADMIN — ENOXAPARIN SODIUM SCH: 80 INJECTION SUBCUTANEOUS at 21:39

## 2018-08-07 RX ADMIN — ENOXAPARIN SODIUM SCH MG: 80 INJECTION SUBCUTANEOUS at 12:25

## 2018-08-07 RX ADMIN — METOPROLOL SUCCINATE SCH MG: 25 TABLET, EXTENDED RELEASE ORAL at 17:12

## 2018-08-07 NOTE — US
Date of service: [facility] 



08/07/2018



PROCEDURE:  Bilateral lower extremity venous duplex Doppler. 



HISTORY:

r/o DVT



COMPARISON:

None available. 



TECHNIQUE:

Bilateral common femoral, superficial femoral, popliteal and 

posterior tibial veins were evaluated. Flow was assessed with color 

Doppler, compressibility, assessment of phasic flow and augmentation 

response. 



FINDINGS:



COMMON FEMORAL VEIN:

Right CFV:  Unremarkable.



Left CFV:  Unremarkable.



SUPERFICIAL FEMORAL VEIN:

Right SFV: Unremarkable.



Left SFV:  Unremarkable.



POPLITEAL VEIN:

Right Popliteal:  Unremarkable.



Left Popliteal:  Unremarkable.



POSTERIOR TIBIAL VEIN:

Right PTV: Unremarkable.



Left PTV: Unremarkable.



OTHER FINDINGS:

None.



IMPRESSION:

No evidence of deep venous thrombosis.

## 2018-08-07 NOTE — CP.PCM.PN
Subjective





- Date & Time of Evaluation


Date of Evaluation: 08/07/18


Time of Evaluation: 07:30





- Subjective


Subjective: 





Patient seen and examined this morning at bedside. NAD, c/o current non-

radiating substernal chest pain which has been going since last 1 week, s/p 

laproscopic ovarian cyst surgery (7/31/18). Patient reports dyspnea on exertion

, denies palpitations, blurred vision, dizziness, abdominal pain or urinary 

symptoms. Patient has tongue numbness which she initially came to the ER for 

evaluation, it started after surgery, getting better but still c/o anterior 

tongue numbness.    





Objective





- Vital Signs/Intake and Output


Vital Signs (last 24 hours): 


 











Temp Pulse Resp BP Pulse Ox


 


 97.8 F   74   12   114/73   98 


 


 08/07/18 06:00  08/07/18 06:55  08/07/18 06:55  08/07/18 06:00  08/07/18 06:55











- Medications


Medications: 


 Current Medications





Acetaminophen (Tylenol 325mg Tab)  650 mg PO Q4 PRN


   PRN Reason: Pain, Mild (1-3)


Enoxaparin Sodium (Lovenox)  68 mg SC Q12 АНДРЕЙ


   PRN Reason: Protocol


Tramadol HCl (Ultram)  50 mg PO Q6 PRN


   PRN Reason: Pain, moderate (4-7)











- Labs


Labs: 


 





 08/06/18 16:15 





 08/06/18 16:15 





 











PT  12.0 Seconds (9.8-13.1)   08/06/18  16:15    


 


INR  1.1   08/06/18  16:15    


 


APTT  34.0 Seconds (25.6-37.1)   08/06/18  16:15    














- Constitutional


Appears: No Acute Distress





- Head Exam


Head Exam: ATRAUMATIC, NORMAL INSPECTION, NORMOCEPHALIC





- Eye Exam


Eye Exam: EOMI, Normal appearance, PERRL


Pupil Exam: NORMAL ACCOMODATION





- ENT Exam


ENT Exam: Mucous Membranes Moist





- Neck Exam


Neck Exam: Full ROM, Normal Inspection





- Respiratory Exam


Respiratory Exam: Clear to Ausculation Bilateral, NORMAL BREATHING PATTERN.  

absent: Accessory Muscle Use, Chest Wall Tenderness, Decreased Breath Sounds, 

Prolonged Expiratory Phase, Rhonchi, Wheezes





- Cardiovascular Exam


Cardiovascular Exam: REGULAR RHYTHM, RRR, +S1, +S2





- GI/Abdominal Exam


GI & Abdominal Exam: Soft, Tenderness (around lap incision ), Normal Bowel 

Sounds.  absent: Distended, Rigid, Hernia, Rebound





- Extremities Exam


Extremities Exam: Full ROM, Normal Capillary Refill, Normal Inspection.  absent

: Pedal Edema, Tenderness


Additional comments: 





Travis's sign





- Back Exam


Back Exam: NORMAL INSPECTION.  absent: CVA tenderness (L), CVA tenderness (R)





- Neurological Exam


Neurological Exam: Alert, Awake, CN II-XII Intact, Oriented x3





- Psychiatric Exam


Psychiatric exam: Normal Affect, Normal Mood





- Skin


Skin Exam: Dry, Intact, Normal Color, Warm





Assessment and Plan





- Assessment and Plan (Free Text)


Assessment: 





A/P:


49 y/o female with a PMH of RA, ovarian cyst s/p laparoscopic ovarian cyst 

surgery performed on 7/31/2018 admitted for possible pulmonary embolism. 





Chest pain, Dyspnea, possibly due to pulmonary embolism, s/p surgery, r/o other 

possibilities  


- Acute


- CT chest : There is a small area of diminished contrast enhancement within 

segmental branch right upper lobe which could represent volume averaging 

artifact however the possibility of a small pulmonary embolus cannot be 

excluded although the former is favored.


- Consult Dr. Foster, pulmonary for possibility of any other work up,  will 

follow up recommendations 


- Follow up Doppler U/S LEs, Echo and Hypercoagulable work up 


- C/W Therapeutic lovenox 68 mg Q12. 





Numbness of anterior portion of tongue  and hoarseness of voice 


- Improved 


- possibly secondary to post intubation nerve injury


- Follow up Mg, Phos, B12, Folate and TSH 





DVT Prophylaxis


- Therapeutic lovenox 68 mg Q12





Code Status


- full code

## 2018-08-07 NOTE — CARD
--------------- APPROVED REPORT --------------





Date of service: 08/06/2018



<Conclusion>

Normal sinus rhythm

T wave abnormality, consider inferior ischemia

Abnormal ECG

## 2018-08-07 NOTE — CARD
--------------- APPROVED REPORT --------------





Date of service: 08/07/2018



EXAM: Two-dimensional and M-mode echocardiogram with Doppler and 

color Doppler.



Other Information 

Quality : GoodRhythm : NSR



INDICATION

Dyspnea 



2D DIMENSIONS 

IVSd1.03   (0.7-1.1cm)LVDd3.89   (3.9-5.9cm)

LVOT Diameter1.95   (1.8-2.4cm)PWd0.98   (0.7-1.1cm)

IVSs1.30   (0.8-1.2cm)LVDs2.78   (2.5-4.0cm)

FS (%) 28.6   %PWs1.25   (0.8-1.2cm)



M-Mode DIMENSIONS 

Left Atrium (MM)3.86   (2.5-4.0cm)IVSd0.85   (0.7-1.1cm)

Aortic Root2.63   (2.2-3.7cm)LVDd4.22   (4.0-5.6cm)

Aortic Cusp Exc.1.80   (1.5-2.0cm)PWd0.93   (0.7-1.1cm)

IVSs1.18   cmFS (%) 34   %

LVDs2.81   (2.0-3.8cm)PWs1.16   cm



Aortic Valve

AoV Peak Acnmwwcp842.3cm/sAoV VTI19.2cmAO Peak GR.5mmHg

LVOT Peak Fvjzsedh81.7cm/sLVOT VTI14.73cmAO Mean GR.3mmHg

GEOVANI (VMAX)1.59gd4JLI (VTI)1.33cm2



Mitral Valve

MV E Etrcultp51.6cm/sMV DECEL [id number] A Pxihquko66.1cm/s

MV BGO22ikX/A ratio0.9MVA (PHT)3.77cm2



TDI

Lateral E' Peak V9.78cm/sMedial E' Peak V5.46cm/sE/Lateral E'5.8

E/Medial E'10.4



Pulmonary Valve

PV Peak Qxvmghwm771.2cm/s



Tricuspid Valve

TR Peak Anjxrbmy585tk/sRAP DZTFNQLN61ztRmUM Peak Gr.15mmHg

[id number]



 LEFT VENTRICLE 

The left ventricle is normal size.

There is normal left ventricular wall thickness.

The left ventricular function is normal.

The left ventricular ejection fraction is within the normal range 

with Ef 55-60%.

No regional wall motion abnormalities noted.

Transmitral Doppler flow pattern is Grade I-abnormal relaxation 

pattern.

No left ventricle thrombus noted on this study.

There is no ventricular septal defect visualized.

There is no mass noted in the left ventricle.



 RIGHT VENTRICLE 

The right ventricle is normal size.

There is normal right ventricular wall thickness.

The right ventricular systolic function is normal.



 ATRIA 

The left atrium size is normal.

The right atrium size is normal.



 AORTIC VALVE 

The aortic valve is normal in structure.

No aortic regurgitation is present.

There is no aortic valvular stenosis.

There is no aortic valvular vegetation.



 MITRAL VALVE 

The mitral valve is normal in structure.

There is no evidence of mitral valve prolapse.

There is no mitral valve stenosis.

There is no mitral valve regurgitation noted.



 TRICUSPID VALVE 

The tricuspid valve is normal in structure.

There is trace tricuspid valve regurgitation noted.

There is no tricuspid valve prolapse or vegetation.

There is no tricuspid valve stenosis.



 PULMONIC VALVE 

The pulmonary valve is normal in structure.

There is no pulmonic valvular regurgitation.

There is no pulmonic valvular stenosis.



 GREAT VESSELS 

The aortic root is normal in size.

The ascending aorta is normal in size.

The IVC is normal in size and collapses >50% with inspiration.



 PERICARDIAL EFFUSION 

The pericardium appears normal.

There is no pleural effusion.



<Conclusion>

The left ventricular ejection fraction is within the normal range 

with Ef 55-60%.

Transmitral Doppler flow pattern is Grade I-abnormal relaxation 

pattern.

The left atrium size is normal.

There is trace tricuspid valve regurgitation noted.

## 2018-08-07 NOTE — CP.PCM.CON
History of Present Illness





- History of Present Illness


History of Present Illness: 





47 YO F w/ PMH of Rheumatoid arthritis, migraine, ovarian cysts s/p laproscopic 

ovarian cyst surgery ( 18) 


presents for chest pain, SOB, numbness of her tongue and voice hoarseness since 

POD 1 of her surgery. 


- Patient states that chest pain is sharp 


 Chest pain is associated with SOB and palpitations. 


Denies worsening SOB and palpation on exertion. 


Claims pain and palpitations are the same at rest or with movement





At the times the pt claims she has palpitations; PE is normal 


and Telemetry shows NSR


 


Denies any extremity tenderness or swelling. 


Patient does not have any previous history of DVT or PE.











PMH:


 Rheumatoid arthritis,


 Ovarian cyst s/p lap ovarian cyst surg ( 18) 


Cholecystectomy,


 ,  





EKG: NSR





Troponin: neg 





Echo: normal


         EF: 55-60%


                  





Past Patient History





- Infectious Disease


Hx of Infectious Diseases: None





- Tetanus Immunizations


Tetanus Immunization: Unknown





- Past Medical History & Family History


Past Medical History?: Yes





- Past Social History


Smoking Status: Never Smoked





- CARDIAC


Hx Cardiac Disorders: No





- PULMONARY


Hx Respiratory Disorders: No





- NEUROLOGICAL


Hx Migraine: Yes





- HEENT


Hx HEENT Problems: No





- RENAL


Hx Chronic Kidney Disease: No





- ENDOCRINE/METABOLIC


Hx Endocrine Disorders: No





- HEMATOLOGICAL/ONCOLOGICAL


Hx AIDS: No


Hx Anemia: Yes


Hx Human Immunodeficiency Virus (HIV): No





- INTEGUMENTARY


Hx Dermatological Problems: No





- MUSCULOSKELETAL/RHEUMATOLOGICAL


Hx Falls: No


Hx Rheumatoid Arthritis: Yes





- GASTROINTESTINAL


Hx Gastrointestinal Disorders: No





- GENITOURINARY/GYNECOLOGICAL


Hx Genitourinary Disorders: Yes





- PSYCHIATRIC


Hx Physical Abuse: No


Hx Substance Use: No





- SURGICAL HISTORY


Hx Cholecystectomy: Yes


Hx Tonsillectomy: Yes


Other/Comment: s/p Lap Ovarian cyst sx 18





- ANESTHESIA


Hx Anesthesia: Yes


Hx Anesthesia Reactions: No


Hx Malignant Hyperthermia: No


Has any member of the family had a problem w/ anesthesia?: No





Meds


Allergies/Adverse Reactions: 


 Allergies











Allergy/AdvReac Type Severity Reaction Status Date / Time


 


SEASONAL Allergy  CONGESTION Uncoded 18 15:14














- Medications


Medications: 


 Current Medications





Acetaminophen (Tylenol 325mg Tab)  650 mg PO Q4 PRN


   PRN Reason: Pain, Mild (1-3)


Enoxaparin Sodium (Lovenox)  70 mg SC Q12 АНДРЕЙ


   PRN Reason: Protocol


   Last Admin: 18 12:25 Dose:  70 mg


Metoprolol Succinate (Toprol Xl)  25 mg PO DAILY [facility]


Polyethylene Glycol (Miralax)  17 gm PO DAILYWM АНДРЕЙ


Senna/Docusate Sodium (Senokot S 50 Mg-8.6 Mg)  2 tab PO HS АНДРЕЙ


Tramadol HCl (Ultram)  50 mg PO Q6 PRN


   PRN Reason: Pain, moderate (4-7)











Physical Exam





- Constitutional


Appears: Well





- Head Exam


Head Exam: NORMAL INSPECTION





- Eye Exam


Eye Exam: Normal appearance





- ENT Exam


ENT Exam: Normal Exam





- Neck Exam


Neck exam: Positive for: Normal Inspection





- Respiratory Exam


Respiratory Exam: NORMAL BREATHING PATTERN





- Cardiovascular Exam


Cardiovascular Exam: REGULAR RHYTHM





Results





- Vital Signs


Recent Vital Signs: 


 Last Vital Signs











Temp  97.7 F   18 16:01


 


Pulse  99 H  18 16:01


 


Resp  20   18 16:01


 


BP  117/81   18 16:01


 


Pulse Ox  98   18 16:01














- Labs


Result Diagrams: 


 18 16:15





 18 16:15


Labs: 


 Laboratory Results - last 24 hr











  18





  16:15 16:15 16:15


 


WBC   7.7 


 


RBC   4.33 


 


Hgb   13.0 


 


Hct   39.0 


 


MCV   90.0 


 


MCH   30.1 


 


MCHC   33.4 


 


RDW   13.1 


 


Plt Count   378 


 


MPV   9.0 


 


Neut % (Auto)   59.0 


 


Lymph % (Auto)   30.3 


 


Mono % (Auto)   8.6 


 


Eos % (Auto)   1.8 


 


Baso % (Auto)   0.3 


 


Neut # (Auto)   4.5 


 


Lymph # (Auto)   2.3 


 


Mono # (Auto)   0.7 


 


Eos # (Auto)   0.1 


 


Baso # (Auto)   0.0 


 


PT    12.0


 


INR    1.1


 


APTT    34.0


 


Sodium  138  


 


Potassium  4.5  


 


Chloride  104  


 


Carbon Dioxide  26  


 


Anion Gap  13  


 


BUN  15  


 


Creatinine  0.6 L  


 


Est GFR ( Amer)  > 60  


 


Est GFR (Non-Af Amer)  > 60  


 


Random Glucose  96  


 


Calcium  8.6  


 


Total Bilirubin  0.6  


 


AST  30  


 


ALT  30  


 


Alkaline Phosphatase  68  


 


Troponin I  0.0250  


 


NT-Pro-B Natriuret Pep  30.9  


 


Total Protein  7.9  


 


Albumin  4.2  


 


Globulin  3.7  


 


Albumin/Globulin Ratio  1.1  














Assessment & Plan


(1) Rheumatoid arthritis


Status: Acute   





(2) Chest pain


Assessment and Plan: 


This appears to be non cardiac in origin


Status: Acute   





(3) Ovarian cyst


Status: Acute

## 2018-08-08 VITALS — SYSTOLIC BLOOD PRESSURE: 111 MMHG | TEMPERATURE: 97.8 F | DIASTOLIC BLOOD PRESSURE: 77 MMHG | HEART RATE: 88 BPM

## 2018-08-08 VITALS — RESPIRATION RATE: 20 BRPM

## 2018-08-08 VITALS — OXYGEN SATURATION: 99 %

## 2018-08-08 LAB
FOLATE SERPL-MCNC: 10 NG/ML
VIT B12 SERPL-MCNC: 519 PG/ML (ref 239–931)

## 2018-08-08 RX ADMIN — ENOXAPARIN SODIUM SCH MG: 80 INJECTION SUBCUTANEOUS at 09:17

## 2018-08-08 RX ADMIN — METOPROLOL SUCCINATE SCH MG: 25 TABLET, EXTENDED RELEASE ORAL at 09:06

## 2018-08-08 RX ADMIN — ENOXAPARIN SODIUM SCH: 80 INJECTION SUBCUTANEOUS at 09:06

## 2018-08-08 NOTE — RAD
Date of service: 



08/08/2018



HISTORY:

Possible PE  



COMPARISON:

Comparison chest 08/06/2018. 



TECHNIQUE:

Chest PA and lateral



FINDINGS:



LUNGS:

Mild biapical pleural thickening. Lung fields otherwise clear. .



PLEURA:

No significant pleural effusion identified. No pneumothorax apparent.



CARDIOVASCULAR:

Normal.



OSSEOUS STRUCTURES:

No significant abnormalities.



VISUALIZED UPPER ABDOMEN:

Normal.



OTHER FINDINGS:

None.



IMPRESSION:





Mild biapical pleural thickening. Lung fields otherwise clear. .

## 2018-08-08 NOTE — CP.PCM.DIS
Provider





- Provider


Date of Admission: 


08/06/18 20:41





Attending physician: 


Winifred Mathews MD





Primary care physician: 





PMD: Dr. Wiggins 


Consults: 





Cardio, Dr. Eugene Olmstead, Dr. Foster 


Time Spent in preparation of Discharge (in minutes): 40





Diagnosis





- Discharge Diagnosis


(1) Atypical chest pain


Status: Acute   





(2) Numbness of tongue


Status: Acute   





Hospital Course





- Lab Results


Lab Results: 


 Most Recent Lab Values











WBC  7.7 K/uL (4.8-10.8)   08/06/18  16:15    


 


RBC  4.33 Mil/uL (3.80-5.20)   08/06/18  16:15    


 


Hgb  13.0 g/dL (12.0-16.0)   08/06/18  16:15    


 


Hct  39.0 % (34.0-47.0)   08/06/18  16:15    


 


MCV  90.0 fl (81.0-99.0)   08/06/18  16:15    


 


MCH  30.1 pg (27.0-31.0)   08/06/18  16:15    


 


MCHC  33.4 g/dL (33.0-37.0)   08/06/18  16:15    


 


RDW  13.1 % (11.5-14.5)   08/06/18  16:15    


 


Plt Count  378 K/uL (130-400)   08/06/18  16:15    


 


MPV  9.0 fl (7.2-11.7)   08/06/18  16:15    


 


Neut % (Auto)  59.0 % (50.0-75.0)   08/06/18  16:15    


 


Lymph % (Auto)  30.3 % (20.0-40.0)   08/06/18  16:15    


 


Mono % (Auto)  8.6 % (0.0-10.0)   08/06/18  16:15    


 


Eos % (Auto)  1.8 % (0.0-4.0)   08/06/18  16:15    


 


Baso % (Auto)  0.3 % (0.0-2.0)   08/06/18  16:15    


 


Neut # (Auto)  4.5 K/uL (1.8-7.0)   08/06/18  16:15    


 


Lymph # (Auto)  2.3 K/uL (1.0-4.3)   08/06/18  16:15    


 


Mono # (Auto)  0.7 K/uL (0.0-0.8)   08/06/18  16:15    


 


Eos # (Auto)  0.1 K/uL (0.0-0.7)   08/06/18  16:15    


 


Baso # (Auto)  0.0 K/uL (0.0-0.2)   08/06/18  16:15    


 


PT  12.0 Seconds (9.8-13.1)   08/06/18  16:15    


 


INR  1.1   08/06/18  16:15    


 


APTT  34.0 Seconds (25.6-37.1)   08/06/18  16:15    


 


Sodium  138 mmol/l (132-148)   08/06/18  16:15    


 


Potassium  4.5 MMOL/L (3.6-5.0)   08/06/18  16:15    


 


Chloride  104 mmol/L ()   08/06/18  16:15    


 


Carbon Dioxide  26 mmol/L (22-30)   08/06/18  16:15    


 


Anion Gap  13  (10-20)   08/06/18  16:15    


 


BUN  15 mg/dl (7-17)   08/06/18  16:15    


 


Creatinine  0.6 mg/dl (0.7-1.2)  L  08/06/18  16:15    


 


Est GFR ( Amer)  > 60   08/06/18  16:15    


 


Est GFR (Non-Af Amer)  > 60   08/06/18  16:15    


 


Random Glucose  96 mg/dL ()   08/06/18  16:15    


 


Calcium  8.6 mg/dL (8.4-10.2)   08/06/18  16:15    


 


Phosphorus  4.1 mg/dl (2.5-4.5)   08/08/18  04:20    


 


Magnesium  1.8 MG/DL (1.6-2.3)   08/08/18  04:20    


 


Total Bilirubin  0.6 mg/dl (0.2-1.3)   08/06/18  16:15    


 


AST  30 U/L (14-36)   08/06/18  16:15    


 


ALT  30 U/L (9-52)   08/06/18  16:15    


 


Alkaline Phosphatase  68 U/L ()   08/06/18  16:15    


 


Troponin I  0.0250 ng/mL (0.00-0.120)   08/06/18  16:15    


 


NT-Pro-B Natriuret Pep  30.9 pg/ml (0-450)   08/06/18  16:15    


 


Total Protein  7.9 G/DL (6.3-8.2)   08/06/18  16:15    


 


Albumin  4.2 g/dL (3.5-5.0)   08/06/18  16:15    


 


Globulin  3.7 gm/dL (2.2-3.9)   08/06/18  16:15    


 


Albumin/Globulin Ratio  1.1  (1.0-2.1)   08/06/18  16:15    


 


Vitamin B12  519 pg/mL (239-931)   08/08/18  04:20    


 


Folate  10.0 ng/mL  08/08/18  04:20    


 


TSH 3rd Generation  4.43 mIU/ML (0.46-4.68)   08/08/18  04:20    














- Hospital Course


Hospital Course: 





47 y/o female with a PMHx of RA, ovarian cyst s/p laparoscopic ovarian cyst 

surgery performed on 7/31/2018 admitted for evaluation and treatment of 

possible pulmonary embolism. CT chest : possibility of a small pulmonary 

embolus. Cardiology and Pulmonary consulted after the admission. Patient was 

started on therapeutic anticoag treatment with Lovenox 70mg SC Q12H, V/Q scan 

was negative for PE and patient refused ABG. Cardiology cleared patient with 

diagnosis of non-cardiac chest pain. Patient was found to have tachycardia on 

ambulation, started on Metoprolol Succinate XL 25mg PO daily. Patient's 

symptoms improved and she was discharged stable with instructions to follow up 

[facility] on 8/17/18 at 1:40 PM.





- START Metoprolol Succinate XL 25mg PO daily





Discharge Exam





- Head Exam


Head Exam: NORMAL INSPECTION





- Eye Exam


Eye Exam: EOMI, Normal appearance, PERRL


Pupil Exam: NORMAL ACCOMODATION





- ENT Exam


ENT Exam: Mucous Membranes Moist





- Respiratory Exam


Respiratory Exam: Clear to PA & Lateral, NORMAL BREATHING PATTERN.  absent: 

Chest Wall Tenderness, Decreased Breath Sounds, Prolonged Expiratory Phase, 

Rales, Rhonchi, Wheezes, Respiratory Distress





- Cardiovascular Exam


Cardiovascular Exam: REGULAR RHYTHM, +S1, +S2





- GI/Abdominal Exam


GI & Abdominal Exam: Normal Bowel Sounds, Soft, Tenderness (Around recent 

surgical site ).  absent: Distended, Hernia, Rigid





- Extremities Exam


Extremities exam: full ROM, normal capillary refill, normal inspection, pedal 

pulses present





- Back Exam


Back exam: NORMAL INSPECTION.  absent: CVA tenderness (L), CVA tenderness (R)





- Neurological Exam


Neurological exam: Alert, CN II-XII Intact, Oriented x3, Reflexes Normal





- Psychiatric Exam


Psychiatric exam: Normal Affect, Normal Mood





- Skin


Skin Exam: Dry, Intact, Normal Color, Warm





Discharge Plan





- Discharge Medications


Prescriptions: 


Metoprolol Succinate XL [Toprol XL] 25 mg PO DAILY #30 tab





- Follow Up Plan


Condition: FAIR


Disposition: HOME/ ROUTINE


Instructions:  Pulmonary Embolism (Blood Clot in the Lungs)


Additional Instructions: 


Follow up Dr. Chris Sánchez on 8/17/18 at 1:40pm


C/w Metoprolol Succinate 25mg PO daily 


Referrals: 


Winifred Mathews MD [Staff Provider] -

## 2018-08-08 NOTE — NM
Date of service: 



08/08/2018



COMPARISON:

August 8, 2018.  



TECHNIQUE:

36.7 mCi technetium 99-m  DTPA aerosol. 



4.74 mCI technetium 99-m MAA administered intravenously.



FINDINGS:



VENTILATION COMPONENT:

Normal.Retention of radionuclide in the tracheobronchial tree and 

ingestion of radionuclide in the stomach,  incidental findings 



PERFUSION COMPONENT:

Normal.



IMPRESSION:

Negative ventilation perfusion scan for pulmonary embolism.

## 2018-08-09 LAB
B2 GLYCOPROT1 IGA SER-ACNC: <9 SAU (ref ?–20)
B2 GLYCOPROT1 IGG SER-ACNC: <9 SGU (ref ?–20)
B2 GLYCOPROT1 IGM SER-ACNC: <9 SMU (ref ?–20)
CARDIOLIPIN IGA SER IA-ACNC: <11 APL (ref ?–11)
CARDIOLIPIN IGG SER IA-ACNC: <14 GPL (ref ?–14)
CARDIOLIPIN IGM SER IA-ACNC: <12 MPL (ref ?–12)
PHOSPHATIDYLSERINE AB IGG: <10 U/ML (ref ?–10)
PS IGA SER-ACNC: <20 U/ML (ref ?–20)
PS IGM SER-ACNC: <25 U/ML (ref ?–25)

## 2019-04-01 ENCOUNTER — HOSPITAL ENCOUNTER (EMERGENCY)
Dept: HOSPITAL 14 - H.ER | Age: 49
Discharge: HOME | End: 2019-04-01
Payer: COMMERCIAL

## 2019-04-01 VITALS — RESPIRATION RATE: 16 BRPM | TEMPERATURE: 97.2 F

## 2019-04-01 VITALS — BODY MASS INDEX: 27.8 KG/M2

## 2019-04-01 VITALS — SYSTOLIC BLOOD PRESSURE: 110 MMHG | HEART RATE: 90 BPM | DIASTOLIC BLOOD PRESSURE: 70 MMHG

## 2019-04-01 DIAGNOSIS — R07.89: ICD-10-CM

## 2019-04-01 DIAGNOSIS — N83.201: ICD-10-CM

## 2019-04-01 DIAGNOSIS — Q51.20: ICD-10-CM

## 2019-04-01 DIAGNOSIS — N83.202: ICD-10-CM

## 2019-04-01 DIAGNOSIS — R10.13: Primary | ICD-10-CM

## 2019-04-01 DIAGNOSIS — M54.9: ICD-10-CM

## 2019-04-01 LAB
ALBUMIN SERPL-MCNC: 4.5 G/DL (ref 3.5–5)
ALBUMIN/GLOB SERPL: 1.2 {RATIO} (ref 1–2.1)
ALT SERPL-CCNC: 25 U/L (ref 9–52)
APTT BLD: 34.1 SECONDS (ref 25.6–37.1)
AST SERPL-CCNC: 27 U/L (ref 14–36)
BASOPHILS # BLD AUTO: 0 K/UL (ref 0–0.2)
BASOPHILS NFR BLD: 0.2 % (ref 0–2)
BILIRUB UR-MCNC: NEGATIVE MG/DL
BUN SERPL-MCNC: 14 MG/DL (ref 7–17)
CALCIUM SERPL-MCNC: 9.2 MG/DL (ref 8.4–10.2)
COLOR UR: YELLOW
EOSINOPHIL # BLD AUTO: 0.1 K/UL (ref 0–0.7)
EOSINOPHIL NFR BLD: 0.8 % (ref 0–4)
ERYTHROCYTE [DISTWIDTH] IN BLOOD BY AUTOMATED COUNT: 13.2 % (ref 11.5–14.5)
GFR NON-AFRICAN AMERICAN: > 60
GLUCOSE UR STRIP-MCNC: (no result) MG/DL
HGB BLD-MCNC: 12.8 G/DL (ref 12–16)
INR PPP: 1
LEUKOCYTE ESTERASE UR-ACNC: (no result) LEU/UL
LIPASE SERPL-CCNC: 274 U/L (ref 23–300)
LYMPHOCYTES # BLD AUTO: 1.4 K/UL (ref 1–4.3)
LYMPHOCYTES NFR BLD AUTO: 11.3 % (ref 20–40)
MCH RBC QN AUTO: 29.5 PG (ref 27–31)
MCHC RBC AUTO-ENTMCNC: 32.6 G/DL (ref 33–37)
MCV RBC AUTO: 90.5 FL (ref 81–99)
MONOCYTES # BLD: 1.1 K/UL (ref 0–0.8)
MONOCYTES NFR BLD: 9 % (ref 0–10)
NEUTROPHILS # BLD: 9.5 K/UL (ref 1.8–7)
NEUTROPHILS NFR BLD AUTO: 78.7 % (ref 50–75)
NRBC BLD AUTO-RTO: 0 % (ref 0–0)
PH UR STRIP: 6 [PH] (ref 5–8)
PLATELET # BLD: 354 K/UL (ref 130–400)
PMV BLD AUTO: 9.7 FL (ref 7.2–11.7)
PROT UR STRIP-MCNC: NEGATIVE MG/DL
PROTHROMBIN TIME: 11.8 SECONDS (ref 9.8–13.1)
RBC # BLD AUTO: 4.33 MIL/UL (ref 3.8–5.2)
RBC # UR STRIP: NEGATIVE /UL
SP GR UR STRIP: 1.02 (ref 1–1.03)
URINE CLARITY: CLEAR
UROBILINOGEN UR-MCNC: (no result) MG/DL (ref 0.2–1)
WBC # BLD AUTO: 12 K/UL (ref 4.8–10.8)

## 2019-04-01 PROCEDURE — 84484 ASSAY OF TROPONIN QUANT: CPT

## 2019-04-01 PROCEDURE — 80053 COMPREHEN METABOLIC PANEL: CPT

## 2019-04-01 PROCEDURE — 74177 CT ABD & PELVIS W/CONTRAST: CPT

## 2019-04-01 PROCEDURE — 83690 ASSAY OF LIPASE: CPT

## 2019-04-01 PROCEDURE — 99284 EMERGENCY DEPT VISIT MOD MDM: CPT

## 2019-04-01 PROCEDURE — 71275 CT ANGIOGRAPHY CHEST: CPT

## 2019-04-01 PROCEDURE — 96374 THER/PROPH/DIAG INJ IV PUSH: CPT

## 2019-04-01 PROCEDURE — 85730 THROMBOPLASTIN TIME PARTIAL: CPT

## 2019-04-01 PROCEDURE — 93005 ELECTROCARDIOGRAM TRACING: CPT

## 2019-04-01 PROCEDURE — 81025 URINE PREGNANCY TEST: CPT

## 2019-04-01 PROCEDURE — 85025 COMPLETE CBC W/AUTO DIFF WBC: CPT

## 2019-04-01 PROCEDURE — 81003 URINALYSIS AUTO W/O SCOPE: CPT

## 2019-04-01 PROCEDURE — 85610 PROTHROMBIN TIME: CPT

## 2019-04-01 NOTE — CT
Date of service: 



04/01/2019



PROCEDURE:  CT Abdomen and Pelvis with contrast



HISTORY:

chest pain back pain epigastric pain



COMPARISON:

Abdomen pelvis CT with contrast 05/26/2017.



TECHNIQUE:

Contrast dose: Visipaque 320, 100 cc



Radiation dose:



Total exam DLP = 1802.0 mGy-cm.



This CT exam was performed using one or more of the following dose 

reduction techniques: Automated exposure control, adjustment of the 

mA and/or kV according to patient size, and/or use of iterative 

reconstruction technique.



FINDINGS:



LIVER:

Unremarkable. No gross lesion or ductal dilatation. 



GALLBLADDER AND BILE DUCTS:

Prior cholecystectomy reiterated. No significant dilatation of the 

common bile duct. 



PANCREAS:

Unremarkable. No gross lesion or ductal dilatation.



SPLEEN:

Unremarkable. 



ADRENALS:

Unremarkable. No mass. 



KIDNEYS AND URETERS:

Mildly prominent left extrarenal pelvis reiterated. No hydronephrosis 

bilaterally or solid mass. 



VASCULATURE:

Unremarkable. No aortic aneurysm. No aortic atherosclerotic 

calcification or mural plaque present.



BOWEL:

Unremarkable. No obstruction. No gross mural thickening. 



APPENDIX:

Normal appendix. 



PERITONEUM:

Unremarkable. No free fluid. No free air. 



LYMPH NODES:

Unremarkable. No enlarged lymph nodes. 



BLADDER:

Unremarkable. 



REPRODUCTIVE:

Enlarged uterus is reiterated with potential bicornuate or septate 

configuration identified. A left adnexal cyst is seen measuring 2.9 

cm at the inferior medial left ovary with a 2.1 cm chronic or 

recurrent cysts present more superiorly but also medial left ovary. 

2.1 cm anterior nabothian cysts reiterated, slightly larger in the 

interval (1.6 cm). 



BONES:

No acute fracture. 



OTHER FINDINGS:

None.



IMPRESSION:

1. No acute abdominal findings as discussed above. Prior 

cholecystectomy reiterated. 



2. 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 Stable 

chronic or recurrent 2.1 cm left adnexal cyst with interval 2.9 cm on 

axial cyst noted more inferiorly at the left ovary. Unremarkable 

right adnexal compartment. 2.1 cm nabothian cyst increased from 1.6 

cm previously.

## 2019-04-01 NOTE — ED PDOC
HPI: Chest Pain


Time Seen by Provider: 19 08:20


Chief Complaint (Nursing): Chest Pain


Chief Complaint (Provider): Chest Pain


History Per: Patient


History/Exam Limitations: no limitations


Onset/Duration Of Symptoms: Days


Current Symptoms Are (Timing): Still Present


Quality: "Pain"


Additional Complaint(s): 


48 year old female presents to the ED complaining of chest pain and back pain 

since 5:00pm yesterday. Chest pain is constant, associated with difficulty 

taking a deep breath, and also radiates to her back. Patient also reports 

epigastric pain. She denies any fever, chills, vomiting, diarrhea, or urinary 

complaints. At times she feels short of breath, though at present she is able to

speak in full sentences. Patient denies taking any medication for pain today. No

oral contraceptive use. No recent travel. 





PMD: the clinic





Past Medical History


Reviewed: Historical Data, Nursing Documentation, Vital Signs


Vital Signs: 





                                Last Vital Signs











Temp  97.9 F   19 08:04


 


Pulse  115 H  19 08:32


 


Resp  17   19 08:32


 


BP  110/70   19 08:32


 


Pulse Ox  99   19 08:32














- Medical History


PMH: Anemia, Arthritis, Migraine, Rheumatoid Arthritis


   Denies: HIV, Chronic Kidney Disease





- Surgical History


Surgical History: Cholecystectomy, Tonsillectomy,  ( x 1)


Other surgeries: Tubal Ligation





- Family History


Family History: States: Unknown Family Hx, CAD, Hypertension





- Immunization History


Hx Tetanus Toxoid Vaccination: No (unsure)


Hx Influenza Vaccination: No


Hx Pneumococcal Vaccination: No





- Home Medications


Home Medications: 


                                Ambulatory Orders











 Medication  Instructions  Recorded


 


Metoprolol Succinate XL [Toprol XL] 25 mg PO DAILY #30 tab 18


 


Famotidine [Pepcid] 20 mg PO DAILY #14 tab 19


 


Ibuprofen [Motrin] 600 mg PO TID #10 tab 19














- Allergies


Allergies/Adverse Reactions: 


                                    Allergies











Allergy/AdvReac Type Severity Reaction Status Date / Time


 


SEASONAL Allergy  CONGESTION Uncoded 19 08:08














DOMI Risk Score for UA/NSTEMI





- DOMI Risk Score


Age > 64: NO


3 or more CAD Risk Factors: NO


Known CAD (Stenosis greater than 50%): NO


Aspirin use in past 7 days: NO


Severe Angina: NO


EKG ST changes greater than 0.5mm: NO


Positive Cardiac Marker: NO


DOMI Score: 0


Risk %: 5%





Wells Criteria for PE





- Wells Criteria for Pulmonary Embolism


Clinical Signs and Symptoms of DVT: No


P.E is #1 Diagnosis, or Equally Likely: Yes


Heart Rate >100: No


Immobilization at least 3 days;Surgery previous 4 weeks: No


Previous, objectively diagnosed PE or DVT: No


Hemoptysis: No


Malignancy w/treatment within 6 months, or palliative: No


Total Score: 1





Review of Systems


ROS Statement: Except As Marked, All Systems Reviewed And Found Negative


Constitutional: Negative for: Fever, Chills


Cardiovascular: Positive for: Chest Pain.  Negative for: Palpitations, Light 

Headedness


Respiratory: Positive for: Shortness of Breath (occasional).  Negative for: Co

ugh, Hemoptysis


Gastrointestinal: Positive for: Abdominal Pain.  Negative for: Vomiting, 

Diarrhea


Genitourinary Female: Negative for: Dysuria, Frequency, Incontinence


Musculoskeletal: Positive for: Back Pain


Skin: Negative for: Rash


Neurological: Negative for: Weakness, Numbness, Headache, Dizziness





Physical Exam





- Reviewed


Nursing Documentation Reviewed: Yes


Vital Signs Reviewed: Yes





- Physical Exam


Appears: Positive for: Uncomfortable


Head Exam: Positive for: ATRAUMATIC, NORMOCEPHALIC


Skin: Positive for: Normal Color, Warm


Eye Exam: Positive for: Normal appearance, EOMI, PERRL


Neck: Positive for: Normal, Supple


Cardiovascular/Chest: Positive for: Tachycardia, Other (Regular rhythm)


Respiratory: Positive for: Normal Breath Sounds.  Negative for: Accessory Muscle

 Use, Rales, Rhonchi, Wheezing, Respiratory Distress


Pulses-Radial (L): 2+


Pulses-Radial (R): 2+


Gastrointestinal/Abdominal: Positive for: Soft, Tenderness (epigastric 

tenderness).  Negative for: Guarding, Rebound


Back: Positive for: Normal Inspection.  Negative for: L CVA Tenderness, R CVA 

Tenderness, Vertebral Tenderness


Extremity: Positive for: Normal ROM.  Negative for: Calf Tenderness, Deformity, 

Swelling


Neurological/Psych: Positive for: Awake, Alert, Oriented (x 3).  Negative for: 

Motor/Sensory Deficits





- Laboratory Results


Result Diagrams: 


                                 19 08:40





                                 19 08:40


Lab Results: 





                                        











PT  11.8 Seconds (9.8-13.1)   19  08:40    


 


INR  1.0   19  08:40    


 


APTT  34.1 Seconds (25.6-37.1)   19  08:40    








                                        











Troponin I  < 0.0120 ng/mL (0.00-0.120)   19  08:40    








                                        











Total Bilirubin  0.3 mg/dl (0.2-1.3)   19  08:40    


 


AST  27 U/L (14-36)   19  08:40    


 


ALT  25 U/L (9-52)   19  08:40    


 


Alkaline Phosphatase  93 U/L ()   19  08:40    


 


Total Protein  8.3 G/DL (6.3-8.2)  H  19  08:40    


 


Albumin  4.5 g/dL (3.5-5.0)   19  08:40    


 


Globulin  3.8 gm/dL (2.2-3.9)   19  08:40    


 


Albumin/Globulin Ratio  1.2  (1.0-2.1)   19  08:40    








                                        











Lipase  274 U/L ()   19  08:40    








                                        











Urine Color  Yellow  (YELLOW)   19  08:40    


 


Urine Clarity  Clear  (Clear)   19  08:40    


 


Urine pH  6.0  (5.0-8.0)   19  08:40    


 


Ur Specific Gravity  1.023  (1.003-1.030)   19  08:40    


 


Urine Protein  Negative mg/dL (NEGATIVE)   19  08:40    


 


Urine Glucose (UA)  Neg mg/dL (NEGATIVE)   19  08:40    


 


Urine Ketones  Negative mg/dL (NEGATIVE)   19  08:40    


 


Urine Blood  Negative  (NEGATIVE)   19  08:40    


 


Urine Nitrate  Negative  (NEGATIVE)   19  08:40    


 


Urine Bilirubin  Negative  (NEGATIVE)   19  08:40    


 


Urine Urobilinogen  0.2-1.0 mg/dL (0.2-1.0)   19  08:40    


 


Ur Leukocyte Esterase  Neg Bonita/uL (Negative)   19  08:40    


 


Urine RBC (Auto)  2 /hpf (0-3)   19  08:40    


 


Urine Microscopic WBC  < 1 /hpf (0-5)   19  08:40    














- ECG


O2 Sat by Pulse Oximetry: 99 (RA)


Pulse Ox Interpretation: Normal





- Progress


Re-evaluation Time: 15:00


Condition: Re-examined, Improved





Medical Decision Making


Medical Decision Making: 





Initial Impression: Chest Pain, Abdominal Pain, Back Pain


Differential diagnosis includes but is not limited to: Pulmonary embolism, 

Dissection, Acute coronary syndrome, Pancreatitis





Initial Plan:


- EKG


- Basic blood work


- Troponin I


- Lipase


- PTT/PT


- Urinalysis


- CTA Chest


- CT Abdomen/Pelvis


- 30 mg IV Toradol


- 2 mg IV Morphine


- Reassess








CT Abdomen/Pelvis results:


Accession No. : [id number]


Patient Name / ID : KEELY MARTINEZ  / [id number]


Exam Date : 2019 11:27:35 ( Approved )


Study Comment : 


Sex / Age : F  / 48Y





Creator : Gomez Burgos MD


Dictator : Gomez Burgos MD


 : 


Approver : Gomez Burgos MD


Approver2 : 





Report Date : 2019 14:40:33


My Comment : 


****************************************************************************

*******


Date of service: 


2019





PROCEDURE:  CT Abdomen and Pelvis with contrast





HISTORY:


chest pain back pain epigastric pain





COMPARISON:


Abdomen pelvis CT with contrast 2017.





TECHNIQUE:


Contrast dose: Visipaque 320, 100 cc





Radiation dose:





Total exam DLP = 1802.0 mGy-cm.





This CT exam was performed using one or more of the following dose reduction 

techniques: Automated exposure control, adjustment of the mA and/or kV according

 to patient size, and/or use of iterative reconstruction technique.





FINDINGS:





LIVER:


Unremarkable. No gross lesion or ductal dilatation. 





GALLBLADDER AND BILE DUCTS:


Prior cholecystectomy reiterated. No significant dilatation of the common bile 

duct. 





PANCREAS:


Unremarkable. No gross lesion or ductal dilatation.





SPLEEN:


Unremarkable. 





ADRENALS:


Unremarkable. No mass. 





KIDNEYS AND URETERS:


Mildly prominent left extrarenal pelvis reiterated. No hydronephrosis 

bilaterally or solid mass. 





VASCULATURE:


Unremarkable. No aortic aneurysm. No aortic atherosclerotic calcification or 

mural plaque present.





BOWEL:


Unremarkable. No obstruction. No gross mural thickening. 





APPENDIX:


Normal appendix. 





PERITONEUM:


Unremarkable. No free fluid. No free air. 





LYMPH NODES:


Unremarkable. No enlarged lymph nodes. 





BLADDER:


Unremarkable. 





REPRODUCTIVE:


Enlarged uterus is reiterated with potential bicornuate or septate configuration

 identified. A left adnexal cyst is seen measuring 2.9 cm at the inferior medial

 left ovary with a 2.1 cm chronic or recurrent cysts present more superiorly but

 also medial left ovary. 2.1 cm anterior nabothian cysts reiterated, slightly 

larger in the interval (1.6 cm). 





BONES:


No acute fracture. 





OTHER FINDINGS:


None.





IMPRESSION:


1. No acute abdominal findings as discussed above. Prior cholecystectomy 

reiterated. 


2. 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 

or bicornuate type uterus or septate uterus. Stable chronic or recurrent 2.1 cm 

left adnexal cyst with interval 2.9 cm on axial cyst noted more inferiorly at 

the left ovary. Unremarkable right adnexal compartment. 2.1 cm nabothian cyst 

increased from 1.6 cm previously.


----------------------------------------------

---------------------------------------------------





CTA Chest results:


Accession No. : [id number]


Patient Name / ID : KEELY MARTINEZ  / [id number]


Exam Date : 2019 11:27:35 ( Approved )


Study Comment : 


Sex / Age : F  / 48Y





Creator : Gomez Burgos MD


Dictator : Gomez Burgos MD


 : 


Approver : Gomez Burgos MD


Approver2 : 





Report Date : 2019 14:44:16


My Comment : 


*********************************************************

**************************


Date of service: 


2019





PROCEDURE:  CT Chest with contrast (Pulmonary Angiogram)





HISTORY:


chest pain dyspnea tachycardi





COMPARISON:


CT ANGIO CHEST 2018.





TECHNIQUE:


Axial computed tomography images were obtained of the chest in the pulmonary 

arterial phase of enhancement. Coronal and sagittal reformatted images were 

created and reviewed.





Intravenous contrast dose: Visipaque 320, 100 cc





Radiation dose:





Total exam DLP = 2703.0 mGy-cm.





This CT exam was performed using one or more of the following dose reduction 

techniques: Automated exposure control, adjustment of the mA and/or kV according

 to patient size, and/or use of iterative reconstruction technique.





FINDINGS:





PULMONARY ARTERIES:


Unremarkable. No pulmonary embolism. 





AORTA:


No acute findings. No thoracic aortic aneurysm. No aortic atherosclerotic 

calcification or mural plaque present.





LUNGS:


Minimal bilateral basilar dependent atelectasis appreciated.  No nodule, mass or

 pulmonary consolidation. 





PLEURAL SPACES:


Unremarkable. No effusion or pneumothorax. 





HEART:


Unremarkable. No cardiomegaly. No significant pericardial effusion. 





LYMPH NODES:


No lymphadenopathy.





BONES, CHEST WALL:


Unremarkable. No fracture or destructive lesion 





OTHER FINDINGS:


Unremarkable. 





IMPRESSION:


1. No CT evidence of pulmonary embolus, pleural or pericardial effusion acute 

infiltrate or pulmonary vascular congestion.  No pneumothorax bilaterally. 


2. Minimal bilateral basilar dependent atelectasis identified.


 

--------------------------------------------------------------------------------


-----------------





Labs and imaging results discussed with patient.








----------------------------------

---------------------------------------------------------------


Scribe Attestation:


Documented by Dione Mesa, acting as a scribe for Marshall Harris MD.


 


Provider Scribe Attestation:


All medical record entries made by the Scribe were at my direction and 

personally dictated by me. I have reviewed the chart and agree that the record 

accurately reflects my personal performance of the history, physical exam, 

medical decision making, and the department course for this patient. I have also

 personally directed, reviewed, and agree with the discharge instructions and 

disposition.








Disposition





- Clinical Impression


Clinical Impression: 


 Abdominal pain, Acute chest pain, Back pain








- Patient ED Disposition


Is Patient to be Admitted: No


Doctor Will See Patient In The: Office


Counseled Patient/Family Regarding: Studies Performed, Diagnosis, Need For 

Followup





- Disposition


Referrals: 


[facility] [Outside]


Disposition: Routine/Home


Disposition Time: 15:00


Condition: GOOD


Additional Instructions: 





JUNA RIGGS, thank you for letting us take care of you today. Your 

provider was Marshall Harris MD and you were treated for CHEST PAIN, BACK 

PAIN. The emergency medical care you received today was directed at your acute 

symptoms. If you were prescribed any medication, please fill it and take as 

directed. It may take several days for your symptoms to resolve. Return to the 

Emergency Department if your symptoms worsen, do not improve, or if you have any

 other problems.





Please contact your doctor or call one of the physicians/clinics you have been 

referred to that are listed on the Patient Visit Information form that is 

included in your discharge packet. Bring any paperwork you were given at 

discharge with you along with any medications you are taking to your follow up 

visit. Our treatment cannot replace ongoing medical care by a primary care 

provider outside of the emergency department.





Thank you for allowing the [facility] team to be part of your care today.











Prescriptions: 


Famotidine [Pepcid] 20 mg PO DAILY #14 tab


Ibuprofen [Motrin] 600 mg PO TID #10 tab


Instructions:  Low Back Pain in Adults, Chest Pain, Stomach Ache and Stomach 

Upset


Forms:  [employer] (English)

## 2019-04-01 NOTE — CARD
--------------- APPROVED REPORT --------------





Date of service: 04/01/2019



EKG Measurement

Heart [id number]

VT [id number]

[id number]

[id number]

[id number]



<Conclusion>

Sinus tachycardia

Nonspecific T wave abnormality

Abnormal ECG

## 2019-04-03 VITALS — OXYGEN SATURATION: 99 %

## 2025-07-19 NOTE — CT
Date of service: 



04/01/2019



PROCEDURE:  CT Chest with contrast (Pulmonary Angiogram)



HISTORY:

chest pain dyspnea tachycardi



COMPARISON:

CT ANGIO CHEST 08/06/2018.



TECHNIQUE:

Axial computed tomography images were obtained of the chest in the 

pulmonary arterial phase of enhancement. Coronal and sagittal 

reformatted images were created and reviewed.



Intravenous contrast dose: Visipaque 320, 100 cc



Radiation dose:



Total exam DLP = 2703.0 mGy-cm.



This CT exam was performed using one or more of the following dose 

reduction techniques: Automated exposure control, adjustment of the 

mA and/or kV according to patient size, and/or use of iterative 

reconstruction technique.



FINDINGS:



PULMONARY ARTERIES:

Unremarkable. No pulmonary embolism. 



AORTA:

No acute findings. No thoracic aortic aneurysm. No aortic 

atherosclerotic calcification or mural plaque present.



LUNGS:

Minimal bilateral basilar dependent atelectasis appreciated.  No 

nodule, mass or pulmonary consolidation. 



PLEURAL SPACES:

Unremarkable. No effusion or pneumothorax. 



HEART:

Unremarkable. No cardiomegaly. No significant pericardial effusion. 



LYMPH NODES:

No lymphadenopathy.



BONES, CHEST WALL:

Unremarkable. No fracture or destructive lesion 



OTHER FINDINGS:

Unremarkable. 



IMPRESSION:

1. No CT evidence of pulmonary embolus, pleural or pericardial 

effusion acute infiltrate or pulmonary vascular congestion.  No 

pneumothorax bilaterally. 



2. Minimal bilateral basilar dependent atelectasis identified. Division of Pharmacy Services    Medication History Completion Note    Name//Age of Patient:  Ping Mcmullen Par / 1991 / 33 year old    Location: [facility] [id number]    Type: Hospital admission & Modality: Phone    Confirmed two patient identifiers: Yes    Confirmed patient is alert & oriented: Yes    Medication History Source (Med History Informants):   Patient      Asked about any missing medications (such as pumps, injectable meds, TPN, OTC, etc): Yes    PTA Med List:   Prior to Admission Medications       Reviewed by Nadya Koehler on 25 at       Medication Sig Last Dose Informant Taking? Status   acetaminophen (TYLENOL) 325 MG tablet Take 2 tablets by mouth every 6 hours as needed for Pain. 2025 11:30 AM Self Yes Active   docusate sodium 100 MG Cap Take 100 mg by mouth daily as needed for Constipation.  Self Yes Active   etonogestrel (NEXPLANON) implant 68 mg     Active   ibuprofen (MOTRIN) 600 MG tablet Take 1 tablet by mouth every 6 hours as needed for Pain.  Self Yes Active   Prenatal Vit-Fe Fumarate-FA (multivitamin & mineral w/folic acid- PRENATAL) 27-1 MG Tab Take 1 tablet by mouth daily. Past Week Self Yes Active                    Selected Pharmacy: [id number] - [facility] [address] AT [facility] & [address]    Comments: None    Medications reconciled by provider: No      Signature/Co-signature, if required: Nadya Koehler     Date/Time: 2025 8:29 PM